# Patient Record
Sex: FEMALE | Race: BLACK OR AFRICAN AMERICAN | NOT HISPANIC OR LATINO | URBAN - METROPOLITAN AREA
[De-identification: names, ages, dates, MRNs, and addresses within clinical notes are randomized per-mention and may not be internally consistent; named-entity substitution may affect disease eponyms.]

---

## 2017-03-27 ENCOUNTER — EMERGENCY (EMERGENCY)
Facility: HOSPITAL | Age: 27
LOS: 1 days | Discharge: HOME | End: 2017-03-27
Attending: EMERGENCY MEDICINE | Admitting: EMERGENCY MEDICINE
Payer: MEDICAID

## 2017-03-27 VITALS
HEART RATE: 58 BPM | DIASTOLIC BLOOD PRESSURE: 64 MMHG | SYSTOLIC BLOOD PRESSURE: 101 MMHG | TEMPERATURE: 98 F | OXYGEN SATURATION: 100 % | RESPIRATION RATE: 15 BRPM

## 2017-03-27 VITALS
OXYGEN SATURATION: 100 % | WEIGHT: 130.07 LBS | TEMPERATURE: 98 F | SYSTOLIC BLOOD PRESSURE: 109 MMHG | RESPIRATION RATE: 16 BRPM | DIASTOLIC BLOOD PRESSURE: 79 MMHG | HEART RATE: 76 BPM

## 2017-03-27 DIAGNOSIS — R10.30 LOWER ABDOMINAL PAIN, UNSPECIFIED: ICD-10-CM

## 2017-03-27 LAB
ALBUMIN SERPL ELPH-MCNC: 3.6 G/DL — SIGNIFICANT CHANGE UP (ref 3.3–5)
ALP SERPL-CCNC: 62 U/L — SIGNIFICANT CHANGE UP (ref 40–120)
ALT FLD-CCNC: 25 U/L — SIGNIFICANT CHANGE UP (ref 12–78)
AMYLASE P1 CFR SERPL: 121 U/L — HIGH (ref 25–115)
ANION GAP SERPL CALC-SCNC: 8 MMOL/L — SIGNIFICANT CHANGE UP (ref 5–17)
APPEARANCE UR: CLEAR — SIGNIFICANT CHANGE UP
AST SERPL-CCNC: 21 U/L — SIGNIFICANT CHANGE UP (ref 15–37)
BACTERIA # UR AUTO: ABNORMAL
BASOPHILS # BLD AUTO: 0.1 K/UL — SIGNIFICANT CHANGE UP (ref 0–0.2)
BASOPHILS NFR BLD AUTO: 1.9 % — SIGNIFICANT CHANGE UP (ref 0–2)
BILIRUB SERPL-MCNC: 0.2 MG/DL — SIGNIFICANT CHANGE UP (ref 0.2–1.2)
BILIRUB UR-MCNC: NEGATIVE — SIGNIFICANT CHANGE UP
BUN SERPL-MCNC: 7 MG/DL — SIGNIFICANT CHANGE UP (ref 7–23)
CALCIUM SERPL-MCNC: 8.6 MG/DL — SIGNIFICANT CHANGE UP (ref 8.5–10.1)
CHLORIDE SERPL-SCNC: 109 MMOL/L — HIGH (ref 96–108)
CO2 SERPL-SCNC: 26 MMOL/L — SIGNIFICANT CHANGE UP (ref 22–31)
COD CRY URNS QL: ABNORMAL
COLOR SPEC: YELLOW — SIGNIFICANT CHANGE UP
COMMENT - URINE: SIGNIFICANT CHANGE UP
CREAT SERPL-MCNC: 0.77 MG/DL — SIGNIFICANT CHANGE UP (ref 0.5–1.3)
DIFF PNL FLD: ABNORMAL
EOSINOPHIL # BLD AUTO: 0.2 K/UL — SIGNIFICANT CHANGE UP (ref 0–0.5)
EOSINOPHIL NFR BLD AUTO: 5.1 % — SIGNIFICANT CHANGE UP (ref 0–6)
EPI CELLS # UR: SIGNIFICANT CHANGE UP
GLUCOSE SERPL-MCNC: 96 MG/DL — SIGNIFICANT CHANGE UP (ref 70–99)
GLUCOSE UR QL: NEGATIVE — SIGNIFICANT CHANGE UP
HCG SERPL-ACNC: <1 MIU/ML — SIGNIFICANT CHANGE UP
HCT VFR BLD CALC: 36.7 % — SIGNIFICANT CHANGE UP (ref 34.5–45)
HGB BLD-MCNC: 12.3 G/DL — SIGNIFICANT CHANGE UP (ref 11.5–15.5)
KETONES UR-MCNC: NEGATIVE — SIGNIFICANT CHANGE UP
LEUKOCYTE ESTERASE UR-ACNC: NEGATIVE — SIGNIFICANT CHANGE UP
LIDOCAIN IGE QN: 200 U/L — SIGNIFICANT CHANGE UP (ref 73–393)
LYMPHOCYTES # BLD AUTO: 1.8 K/UL — SIGNIFICANT CHANGE UP (ref 1–3.3)
LYMPHOCYTES # BLD AUTO: 37.5 % — SIGNIFICANT CHANGE UP (ref 13–44)
MCHC RBC-ENTMCNC: 31.2 PG — SIGNIFICANT CHANGE UP (ref 27–34)
MCHC RBC-ENTMCNC: 33.6 GM/DL — SIGNIFICANT CHANGE UP (ref 32–36)
MCV RBC AUTO: 92.7 FL — SIGNIFICANT CHANGE UP (ref 80–100)
MONOCYTES # BLD AUTO: 0.4 K/UL — SIGNIFICANT CHANGE UP (ref 0–0.9)
MONOCYTES NFR BLD AUTO: 9.4 % — HIGH (ref 1–9)
NEUTROPHILS # BLD AUTO: 2.2 K/UL — SIGNIFICANT CHANGE UP (ref 1.8–7.4)
NEUTROPHILS NFR BLD AUTO: 46 % — SIGNIFICANT CHANGE UP (ref 43–77)
NITRITE UR-MCNC: NEGATIVE — SIGNIFICANT CHANGE UP
PH UR: 7 — SIGNIFICANT CHANGE UP (ref 4.8–8)
PLATELET # BLD AUTO: 187 K/UL — SIGNIFICANT CHANGE UP (ref 150–400)
POTASSIUM SERPL-MCNC: 3.7 MMOL/L — SIGNIFICANT CHANGE UP (ref 3.5–5.3)
POTASSIUM SERPL-SCNC: 3.7 MMOL/L — SIGNIFICANT CHANGE UP (ref 3.5–5.3)
PROT SERPL-MCNC: 7 G/DL — SIGNIFICANT CHANGE UP (ref 6–8.3)
PROT UR-MCNC: NEGATIVE — SIGNIFICANT CHANGE UP
RBC # BLD: 3.96 M/UL — SIGNIFICANT CHANGE UP (ref 3.8–5.2)
RBC # FLD: 11.7 % — SIGNIFICANT CHANGE UP (ref 10.3–14.5)
RBC CASTS # UR COMP ASSIST: SIGNIFICANT CHANGE UP /HPF (ref 0–4)
SODIUM SERPL-SCNC: 143 MMOL/L — SIGNIFICANT CHANGE UP (ref 135–145)
SP GR SPEC: 1.01 — SIGNIFICANT CHANGE UP (ref 1.01–1.02)
UROBILINOGEN FLD QL: NEGATIVE — SIGNIFICANT CHANGE UP
WBC # BLD: 4.7 K/UL — SIGNIFICANT CHANGE UP (ref 3.8–10.5)
WBC # FLD AUTO: 4.7 K/UL — SIGNIFICANT CHANGE UP (ref 3.8–10.5)
WBC UR QL: SIGNIFICANT CHANGE UP

## 2017-03-27 PROCEDURE — 76830 TRANSVAGINAL US NON-OB: CPT | Mod: 26

## 2017-03-27 PROCEDURE — 99284 EMERGENCY DEPT VISIT MOD MDM: CPT

## 2017-03-27 RX ORDER — SODIUM CHLORIDE 9 MG/ML
3 INJECTION INTRAMUSCULAR; INTRAVENOUS; SUBCUTANEOUS ONCE
Qty: 0 | Refills: 0 | Status: COMPLETED | OUTPATIENT
Start: 2017-03-27 | End: 2017-03-27

## 2017-03-27 RX ORDER — SODIUM CHLORIDE 9 MG/ML
1000 INJECTION INTRAMUSCULAR; INTRAVENOUS; SUBCUTANEOUS ONCE
Qty: 0 | Refills: 0 | Status: COMPLETED | OUTPATIENT
Start: 2017-03-27 | End: 2017-03-27

## 2017-03-27 RX ORDER — ONDANSETRON 8 MG/1
4 TABLET, FILM COATED ORAL ONCE
Qty: 0 | Refills: 0 | Status: COMPLETED | OUTPATIENT
Start: 2017-03-27 | End: 2017-03-27

## 2017-03-27 RX ORDER — IOHEXOL 300 MG/ML
30 INJECTION, SOLUTION INTRAVENOUS ONCE
Qty: 0 | Refills: 0 | Status: COMPLETED | OUTPATIENT
Start: 2017-03-27 | End: 2017-03-27

## 2017-03-27 RX ADMIN — SODIUM CHLORIDE 1000 MILLILITER(S): 9 INJECTION INTRAMUSCULAR; INTRAVENOUS; SUBCUTANEOUS at 21:50

## 2017-03-27 RX ADMIN — IOHEXOL 30 MILLILITER(S): 300 INJECTION, SOLUTION INTRAVENOUS at 21:51

## 2017-03-27 RX ADMIN — SODIUM CHLORIDE 3 MILLILITER(S): 9 INJECTION INTRAMUSCULAR; INTRAVENOUS; SUBCUTANEOUS at 21:22

## 2017-03-27 RX ADMIN — ONDANSETRON 4 MILLIGRAM(S): 8 TABLET, FILM COATED ORAL at 21:50

## 2017-03-27 NOTE — ED PROVIDER NOTE - OBJECTIVE STATEMENT
pt c/o lower abd pain x 1 yr. no fevers, chills, ha, cp, sob, diarrhea, constipation, dysuria, hematuria, freq, vag bleed or d/c.  pmd - [facility] pt c/o lower abd pain x 1 yr. no fevers, chills, ha, cp, sob, diarrhea, constipation, dysuria, hematuria, freq, vag bleed or d/c. pt seen by gi for same, had neg colonoscopy.  pmd - [facility]  gi - [facility]

## 2017-03-27 NOTE — ED PROVIDER NOTE - CHPI ED SYMPTOMS NEG
no abdominal distension/no dysuria/no vomiting/no fever/no chills/no nausea/no hematuria/no diarrhea

## 2017-03-27 NOTE — ED PROVIDER NOTE - PROGRESS NOTE DETAILS
Reevaluated patient at bedside.  Patient feeling well.  Discussed the results of all diagnostic testing in ED and copies of all reports given.   An opportunity to ask questions was given.  Discussed the importance of prompt, close medical follow-up.  Patient will return with any changes, concerns or persistent / worsening symptoms.  Understanding of all instructions verbalized.

## 2017-03-28 LAB
CULTURE RESULTS: SIGNIFICANT CHANGE UP
SPECIMEN SOURCE: SIGNIFICANT CHANGE UP

## 2017-03-28 PROCEDURE — 99284 EMERGENCY DEPT VISIT MOD MDM: CPT | Mod: 25

## 2017-03-28 PROCEDURE — 74177 CT ABD & PELVIS W/CONTRAST: CPT | Mod: 26

## 2017-03-28 PROCEDURE — 83690 ASSAY OF LIPASE: CPT

## 2017-03-28 PROCEDURE — 76830 TRANSVAGINAL US NON-OB: CPT

## 2017-03-28 PROCEDURE — 85027 COMPLETE CBC AUTOMATED: CPT

## 2017-03-28 PROCEDURE — 80053 COMPREHEN METABOLIC PANEL: CPT

## 2017-03-28 PROCEDURE — 81001 URINALYSIS AUTO W/SCOPE: CPT

## 2017-03-28 PROCEDURE — 87086 URINE CULTURE/COLONY COUNT: CPT

## 2017-03-28 PROCEDURE — 74177 CT ABD & PELVIS W/CONTRAST: CPT

## 2017-03-28 PROCEDURE — 96374 THER/PROPH/DIAG INJ IV PUSH: CPT | Mod: 59

## 2017-03-28 PROCEDURE — 84702 CHORIONIC GONADOTROPIN TEST: CPT

## 2017-03-28 PROCEDURE — 82150 ASSAY OF AMYLASE: CPT

## 2017-04-22 NOTE — ED ADULT NURSE NOTE - OBJECTIVE STATEMENT
<<----- Click to add NO significant Past Surgical History
26 year old female presents to the ED with c/o abdominal pain. Pt states she has had chronic digestive issues for about a year. Today, the pain was so bad, she had to leave work. PT A+O x 4. Pt [id number] pain in the mid lower abdomen, worse with deep palpation. Pt reports nausea but denies vomiting or diarrhea. Pt reports occasional bouts of "loose stool".  + BS in all quadrants. LMP 3/6/17. Pt denies urinary s/s.

## 2017-04-23 ENCOUNTER — EMERGENCY (EMERGENCY)
Facility: HOSPITAL | Age: 27
LOS: 1 days | Discharge: HOME | End: 2017-04-23
Attending: EMERGENCY MEDICINE | Admitting: EMERGENCY MEDICINE
Payer: MEDICAID

## 2017-04-23 VITALS
OXYGEN SATURATION: 99 % | SYSTOLIC BLOOD PRESSURE: 119 MMHG | HEART RATE: 65 BPM | DIASTOLIC BLOOD PRESSURE: 74 MMHG | TEMPERATURE: 99 F | RESPIRATION RATE: 16 BRPM

## 2017-04-23 VITALS
OXYGEN SATURATION: 98 % | WEIGHT: 134.92 LBS | SYSTOLIC BLOOD PRESSURE: 121 MMHG | RESPIRATION RATE: 16 BRPM | HEART RATE: 678 BPM | HEIGHT: 65 IN | DIASTOLIC BLOOD PRESSURE: 73 MMHG | TEMPERATURE: 98 F

## 2017-04-23 LAB
ALBUMIN SERPL ELPH-MCNC: 3.9 G/DL — SIGNIFICANT CHANGE UP (ref 3.3–5)
ALP SERPL-CCNC: 56 U/L — SIGNIFICANT CHANGE UP (ref 40–120)
ALT FLD-CCNC: 19 U/L — SIGNIFICANT CHANGE UP (ref 12–78)
AMYLASE P1 CFR SERPL: 67 U/L — SIGNIFICANT CHANGE UP (ref 25–115)
ANION GAP SERPL CALC-SCNC: 11 MMOL/L — SIGNIFICANT CHANGE UP (ref 5–17)
APPEARANCE UR: CLEAR — SIGNIFICANT CHANGE UP
AST SERPL-CCNC: 16 U/L — SIGNIFICANT CHANGE UP (ref 15–37)
BACTERIA # UR AUTO: ABNORMAL
BILIRUB DIRECT SERPL-MCNC: 0.2 MG/DL — SIGNIFICANT CHANGE UP (ref 0.05–0.2)
BILIRUB INDIRECT FLD-MCNC: 0.5 MG/DL — SIGNIFICANT CHANGE UP (ref 0.2–1)
BILIRUB SERPL-MCNC: 0.7 MG/DL — SIGNIFICANT CHANGE UP (ref 0.2–1.2)
BILIRUB UR-MCNC: ABNORMAL
BUN SERPL-MCNC: 10 MG/DL — SIGNIFICANT CHANGE UP (ref 7–23)
CALCIUM SERPL-MCNC: 8.5 MG/DL — SIGNIFICANT CHANGE UP (ref 8.5–10.1)
CHLORIDE SERPL-SCNC: 105 MMOL/L — SIGNIFICANT CHANGE UP (ref 96–108)
CO2 SERPL-SCNC: 23 MMOL/L — SIGNIFICANT CHANGE UP (ref 22–31)
COLOR SPEC: YELLOW — SIGNIFICANT CHANGE UP
COMMENT - URINE: SIGNIFICANT CHANGE UP
CREAT SERPL-MCNC: 0.78 MG/DL — SIGNIFICANT CHANGE UP (ref 0.5–1.3)
DIFF PNL FLD: ABNORMAL
EPI CELLS # UR: SIGNIFICANT CHANGE UP
GLUCOSE SERPL-MCNC: 70 MG/DL — SIGNIFICANT CHANGE UP (ref 70–99)
GLUCOSE UR QL: NEGATIVE — SIGNIFICANT CHANGE UP
HCG SERPL-ACNC: <1 MIU/ML — SIGNIFICANT CHANGE UP
HCT VFR BLD CALC: 42.3 % — SIGNIFICANT CHANGE UP (ref 34.5–45)
HGB BLD-MCNC: 13.8 G/DL — SIGNIFICANT CHANGE UP (ref 11.5–15.5)
KETONES UR-MCNC: ABNORMAL
LEUKOCYTE ESTERASE UR-ACNC: ABNORMAL
LIDOCAIN IGE QN: 90 U/L — SIGNIFICANT CHANGE UP (ref 73–393)
MCHC RBC-ENTMCNC: 31.1 PG — SIGNIFICANT CHANGE UP (ref 27–34)
MCHC RBC-ENTMCNC: 32.7 GM/DL — SIGNIFICANT CHANGE UP (ref 32–36)
MCV RBC AUTO: 95.1 FL — SIGNIFICANT CHANGE UP (ref 80–100)
NITRITE UR-MCNC: NEGATIVE — SIGNIFICANT CHANGE UP
PH UR: 5 — SIGNIFICANT CHANGE UP (ref 5–8)
PLATELET # BLD AUTO: 215 K/UL — SIGNIFICANT CHANGE UP (ref 150–400)
POTASSIUM SERPL-MCNC: 3.4 MMOL/L — LOW (ref 3.5–5.3)
POTASSIUM SERPL-SCNC: 3.4 MMOL/L — LOW (ref 3.5–5.3)
PROT SERPL-MCNC: 7.4 G/DL — SIGNIFICANT CHANGE UP (ref 6–8.3)
PROT UR-MCNC: 25 MG/DL
RBC # BLD: 4.45 M/UL — SIGNIFICANT CHANGE UP (ref 3.8–5.2)
RBC # FLD: 11.4 % — SIGNIFICANT CHANGE UP (ref 10.3–14.5)
RBC CASTS # UR COMP ASSIST: SIGNIFICANT CHANGE UP /HPF (ref 0–4)
SODIUM SERPL-SCNC: 139 MMOL/L — SIGNIFICANT CHANGE UP (ref 135–145)
SP GR SPEC: 1.02 — SIGNIFICANT CHANGE UP (ref 1.01–1.02)
UROBILINOGEN FLD QL: 4
WBC # BLD: 3 K/UL — LOW (ref 3.8–10.5)
WBC # FLD AUTO: 3 K/UL — LOW (ref 3.8–10.5)
WBC UR QL: SIGNIFICANT CHANGE UP

## 2017-04-23 PROCEDURE — 83690 ASSAY OF LIPASE: CPT

## 2017-04-23 PROCEDURE — 93005 ELECTROCARDIOGRAM TRACING: CPT

## 2017-04-23 PROCEDURE — 81001 URINALYSIS AUTO W/SCOPE: CPT

## 2017-04-23 PROCEDURE — 99284 EMERGENCY DEPT VISIT MOD MDM: CPT

## 2017-04-23 PROCEDURE — 80076 HEPATIC FUNCTION PANEL: CPT

## 2017-04-23 PROCEDURE — 82150 ASSAY OF AMYLASE: CPT

## 2017-04-23 PROCEDURE — 84702 CHORIONIC GONADOTROPIN TEST: CPT

## 2017-04-23 PROCEDURE — 99284 EMERGENCY DEPT VISIT MOD MDM: CPT | Mod: 25

## 2017-04-23 PROCEDURE — 80048 BASIC METABOLIC PNL TOTAL CA: CPT

## 2017-04-23 PROCEDURE — 96374 THER/PROPH/DIAG INJ IV PUSH: CPT

## 2017-04-23 PROCEDURE — 85027 COMPLETE CBC AUTOMATED: CPT

## 2017-04-23 PROCEDURE — 93010 ELECTROCARDIOGRAM REPORT: CPT

## 2017-04-23 RX ORDER — SODIUM CHLORIDE 9 MG/ML
1000 INJECTION INTRAMUSCULAR; INTRAVENOUS; SUBCUTANEOUS ONCE
Qty: 0 | Refills: 0 | Status: COMPLETED | OUTPATIENT
Start: 2017-04-23 | End: 2017-04-23

## 2017-04-23 RX ORDER — ONDANSETRON 8 MG/1
4 TABLET, FILM COATED ORAL ONCE
Qty: 0 | Refills: 0 | Status: COMPLETED | OUTPATIENT
Start: 2017-04-23 | End: 2017-04-23

## 2017-04-23 RX ADMIN — SODIUM CHLORIDE 1000 MILLILITER(S): 9 INJECTION INTRAMUSCULAR; INTRAVENOUS; SUBCUTANEOUS at 10:15

## 2017-04-23 RX ADMIN — ONDANSETRON 4 MILLIGRAM(S): 8 TABLET, FILM COATED ORAL at 10:31

## 2017-04-23 NOTE — ED ADULT NURSE NOTE - OBJECTIVE STATEMENT
Pt reports feeling faint, weak & nauseous since yesterday. Pt reports she is under a lot of stress & is losing her hair. Pt reports googling her symptoms & becoming concerned that she might be very ill. Pt states "people are making fun of me about it." Pt reports Hx pancreatitis, states she is F/U with GI early May, reports abd. pain "once in a blue." Denies pain at this time. Pt reports muscle aches at times, states she has been dehydrated in the past. Pt reports she is currently being treated for thrush, dry tongue noted but no white patches present. Pt reports intermittent loose stool, states "it depends on what I eat."

## 2017-04-23 NOTE — ED PROVIDER NOTE - OBJECTIVE STATEMENT
27 y/o F c/o occasionally feeling lightheaded and nauseas for the past 2 days.  Denies fever, vomiting, pain, urinary symptoms, or any other complaints.

## 2017-04-23 NOTE — ED PROVIDER NOTE - PROGRESS NOTE DETAILS
Pt mentioned in triage that she wanted to speak with a .  However now denies to myself and RN that she wishes to speak with a .  Had long conversation with pt - regarding safety at home and any other concerns she may have.  Pt denies any issues.

## 2017-04-26 DIAGNOSIS — F17.210 NICOTINE DEPENDENCE, CIGARETTES, UNCOMPLICATED: ICD-10-CM

## 2017-04-26 DIAGNOSIS — R42 DIZZINESS AND GIDDINESS: ICD-10-CM

## 2017-04-26 DIAGNOSIS — R11.0 NAUSEA: ICD-10-CM

## 2019-08-21 NOTE — ED ADULT NURSE NOTE - URINE COLOR
NUBIA NARVAEZ    Patient Age: 36 year old   Refill request by: Phone.  Caller informed to check with the pharmacy later for their refill.  If problems arise, we will contact patient.  Refill to be: ePrescribed to [facility] #[id number] [address] AT [facility] OF [address]  pharmacy    Medication requested to be refilled:   levothyroxine (SYNTHROID, LEVOTHROID) 175 MCG tablet    Patient states that she received lab results via [employer]. States that pharmacy has not received script. States that she is out of medication. Message confirmed with caller        WEIGHT AND HEIGHT:   Wt Readings from Last 1 Encounters:   08/06/19 88.5 kg (195 lb)     Ht Readings from Last 1 Encounters:   07/11/19 5' 1\" (1.549 m)     BMI Readings from Last 1 Encounters:   08/06/19 36.84 kg/m²       ALLERGIES: no known allergies.  Current Outpatient Medications   Medication   • levothyroxine (SYNTHROID, LEVOTHROID) 175 MCG tablet   • Prenatal Vit-Fe Fum-FA-Omega (PNV PRENATAL PLUS MULTIVIT+DHA) 27-1 & 312 MG Misc     No current facility-administered medications for this visit.      PHARMACY to use: [facility] #[id number] [address] AT [facility] [address]               Pharmacy preference(s) on file:   [facility] #[id number] [address] AT [facility] [address]  [address]  [facility] [address]  Phone: [phone] Fax: [phone]    [id number] [place] [address]  [address]  [facility] [address]  Phone: [phone] Fax: [phone]      CALL BACK INFO: DO NOT LEAVE A MESSAGE - contact patient directly  ROUTING: ROUTE TO COVERING PHYSICIAN for response.        PCP: Maulik Fernandez MD         INS: Payor: [employer] / Plan: [employer] / Product Type: [place] Risk   PATIENT ADDRESS:  [address] [place] [id number]     pavel

## 2019-12-22 NOTE — ED PROVIDER NOTE - CPE EDP GU NORM
Contacted patient regarding clinic visit and condition. Patient feeling somewhat better, able to eat cereal this morning. Advised patient that if condition worsens again, she should be seen in ED for CT to rule out Diverticulitis. Patient amenable to this plan of action.
normal...

## 2022-08-08 NOTE — ED ADULT NURSE NOTE - DISCHARGE DATE/TIME
Results reviewed, please let the patient know that overall findings are reassuring, normal cardiac structure and function. Follow up as previously planned, thanks!     28-Mar-2017 00:20

## 2023-03-26 PROBLEM — K85.80 OTHER ACUTE PANCREATITIS WITHOUT NECROSIS OR INFECTION: Chronic | Status: ACTIVE | Noted: 2017-03-27

## 2023-03-29 ENCOUNTER — APPOINTMENT (OUTPATIENT)
Dept: OBGYN | Facility: CLINIC | Age: 33
End: 2023-03-29

## 2023-08-01 NOTE — ED ADULT TRIAGE NOTE - TEMPERATURE IN FAHRENHEIT (DEGREES F)
After review of her left hip xray, can get patient scheduled for left hip injection in xray, if patient would like this done, message left to return our call.
98

## 2024-05-15 NOTE — ED ADULT NURSE NOTE - NS ED NURSE LEVEL OF CONSCIOUSNESS MENTAL STATUS
Alert/Awake/Cooperative Pt is awake, alert and ambulatory complaining of lower abdominal pain and fever. Pt got known hx of DM on metformin.

## 2025-01-16 DIAGNOSIS — O09.30 SUPERVISION OF PREGNANCY WITH INSUFFICIENT ANTENATAL CARE, UNSPECIFIED TRIMESTER: ICD-10-CM

## 2025-01-16 DIAGNOSIS — F17.200 NICOTINE DEPENDENCE, UNSPECIFIED, UNCOMPLICATED: ICD-10-CM

## 2025-01-20 ENCOUNTER — NON-APPOINTMENT (OUTPATIENT)
Age: 35
End: 2025-01-20

## 2025-01-20 ENCOUNTER — ASOB RESULT (OUTPATIENT)
Age: 35
End: 2025-01-20

## 2025-01-20 ENCOUNTER — APPOINTMENT (OUTPATIENT)
Dept: ANTEPARTUM | Facility: CLINIC | Age: 35
End: 2025-01-20
Payer: MEDICARE

## 2025-01-20 PROCEDURE — 76811 OB US DETAILED SNGL FETUS: CPT

## 2025-01-23 ENCOUNTER — APPOINTMENT (OUTPATIENT)
Dept: ANTEPARTUM | Facility: CLINIC | Age: 35
End: 2025-01-23
Payer: MEDICARE

## 2025-01-23 ENCOUNTER — APPOINTMENT (OUTPATIENT)
Dept: MATERNAL FETAL MEDICINE | Facility: CLINIC | Age: 35
End: 2025-01-23
Payer: MEDICARE

## 2025-01-23 VITALS
HEIGHT: 65 IN | HEART RATE: 78 BPM | DIASTOLIC BLOOD PRESSURE: 68 MMHG | OXYGEN SATURATION: 95 % | SYSTOLIC BLOOD PRESSURE: 100 MMHG | BODY MASS INDEX: 22.82 KG/M2 | WEIGHT: 137 LBS

## 2025-01-23 DIAGNOSIS — F17.200 NICOTINE DEPENDENCE, UNSPECIFIED, UNCOMPLICATED: ICD-10-CM

## 2025-01-23 DIAGNOSIS — F20.9 SCHIZOPHRENIA, UNSPECIFIED: ICD-10-CM

## 2025-01-23 DIAGNOSIS — O09.529 SUPERVISION OF ELDERLY MULTIGRAVIDA, UNSPECIFIED TRIMESTER: ICD-10-CM

## 2025-01-23 DIAGNOSIS — Z78.9 OTHER SPECIFIED HEALTH STATUS: ICD-10-CM

## 2025-01-23 DIAGNOSIS — O09.891 SUPERVISION OF OTHER HIGH RISK PREGNANCIES, FIRST TRIMESTER: ICD-10-CM

## 2025-01-23 DIAGNOSIS — Z3A.22 22 WEEKS GESTATION OF PREGNANCY: ICD-10-CM

## 2025-01-23 DIAGNOSIS — F31.9 OTHER MENTAL DISORDERS COMPLICATING PREGNANCY, UNSPECIFIED TRIMESTER: ICD-10-CM

## 2025-01-23 DIAGNOSIS — O99.340 OTHER MENTAL DISORDERS COMPLICATING PREGNANCY, UNSPECIFIED TRIMESTER: ICD-10-CM

## 2025-01-23 DIAGNOSIS — Z84.89 FAMILY HISTORY OF OTHER SPECIFIED CONDITIONS: ICD-10-CM

## 2025-01-23 PROCEDURE — ZZZZZ: CPT

## 2025-01-23 PROCEDURE — 99204 OFFICE O/P NEW MOD 45 MIN: CPT

## 2025-01-23 RX ORDER — GABAPENTIN 300 MG/1
300 CAPSULE ORAL
Refills: 0 | Status: ACTIVE | COMMUNITY
Start: 2025-01-23

## 2025-01-23 RX ORDER — CHOLECALCIFEROL (VITAMIN D3) 25 MCG
27-0.8-228 TABLET,CHEWABLE ORAL
Refills: 0 | Status: ACTIVE | COMMUNITY
Start: 2025-01-23

## 2025-01-23 RX ORDER — MIRTAZAPINE 15 MG/1
15 TABLET, FILM COATED ORAL
Refills: 0 | Status: ACTIVE | COMMUNITY
Start: 2025-01-23

## 2025-01-23 RX ORDER — LAMOTRIGINE 150 MG/1
150 TABLET ORAL DAILY
Refills: 0 | Status: ACTIVE | COMMUNITY
Start: 2025-01-23

## 2025-03-03 ENCOUNTER — APPOINTMENT (OUTPATIENT)
Dept: ANTEPARTUM | Facility: CLINIC | Age: 35
End: 2025-03-03

## 2025-03-10 ENCOUNTER — APPOINTMENT (OUTPATIENT)
Dept: ANTEPARTUM | Facility: CLINIC | Age: 35
End: 2025-03-10

## 2025-03-24 ENCOUNTER — INPATIENT (INPATIENT)
Facility: HOSPITAL | Age: 35
LOS: 3 days | Discharge: HOME | End: 2025-03-28
Attending: OBSTETRICS & GYNECOLOGY | Admitting: OBSTETRICS & GYNECOLOGY
Payer: MEDICARE

## 2025-03-24 ENCOUNTER — TRANSCRIPTION ENCOUNTER (OUTPATIENT)
Age: 35
End: 2025-03-24

## 2025-03-24 ENCOUNTER — RESULT REVIEW (OUTPATIENT)
Age: 35
End: 2025-03-24

## 2025-03-24 VITALS — TEMPERATURE: 98 F | HEART RATE: 84 BPM | SYSTOLIC BLOOD PRESSURE: 113 MMHG | DIASTOLIC BLOOD PRESSURE: 63 MMHG

## 2025-03-24 DIAGNOSIS — O26.899 OTHER SPECIFIED PREGNANCY RELATED CONDITIONS, UNSPECIFIED TRIMESTER: ICD-10-CM

## 2025-03-24 DIAGNOSIS — O26.893 OTHER SPECIFIED PREGNANCY RELATED CONDITIONS, THIRD TRIMESTER: ICD-10-CM

## 2025-03-24 LAB
ABO RH CONFIRMATION: SIGNIFICANT CHANGE UP
BASOPHILS # BLD AUTO: 0.05 K/UL — SIGNIFICANT CHANGE UP (ref 0–0.2)
BASOPHILS NFR BLD AUTO: 0.4 % — SIGNIFICANT CHANGE UP (ref 0–2)
BLD GP AB SCN SERPL QL: SIGNIFICANT CHANGE UP
EOSINOPHIL # BLD AUTO: 0.22 K/UL — SIGNIFICANT CHANGE UP (ref 0–0.5)
EOSINOPHIL NFR BLD AUTO: 1.7 % — SIGNIFICANT CHANGE UP (ref 0–6)
HBV SURFACE AG SERPL QL IA: SIGNIFICANT CHANGE UP
HCT VFR BLD CALC: 31.9 % — LOW (ref 34.5–45)
HCT VFR BLD CALC: 32.3 % — LOW (ref 34.5–45)
HCV AB S/CO SERPL IA: 0.07 S/CO — SIGNIFICANT CHANGE UP (ref 0–0.79)
HCV AB SERPL-IMP: SIGNIFICANT CHANGE UP
HGB BLD-MCNC: 11.2 G/DL — LOW (ref 11.5–15.5)
HGB BLD-MCNC: 11.4 G/DL — LOW (ref 11.5–15.5)
HIV 1 & 2 AB SERPL IA.RAPID: SIGNIFICANT CHANGE UP
HIV 1+2 AB+HIV1 P24 AG SERPL QL IA: SIGNIFICANT CHANGE UP
IMM GRANULOCYTES # BLD AUTO: 0.2 K/UL — HIGH (ref 0–0.07)
IMM GRANULOCYTES NFR BLD AUTO: 1.6 % — HIGH (ref 0–0.9)
LYMPHOCYTES # BLD AUTO: 1.26 K/UL — SIGNIFICANT CHANGE UP (ref 1–3.3)
LYMPHOCYTES NFR BLD AUTO: 9.9 % — LOW (ref 13–44)
MCHC RBC-ENTMCNC: 31.1 PG — SIGNIFICANT CHANGE UP (ref 27–34)
MCHC RBC-ENTMCNC: 35.3 G/DL — SIGNIFICANT CHANGE UP (ref 32–36)
MCV RBC AUTO: 88.3 FL — SIGNIFICANT CHANGE UP (ref 80–100)
MEV IGG SER-ACNC: >300 AU/ML — SIGNIFICANT CHANGE UP
MEV IGG+IGM SER-IMP: POSITIVE — SIGNIFICANT CHANGE UP
MONOCYTES # BLD AUTO: 0.74 K/UL — SIGNIFICANT CHANGE UP (ref 0–0.9)
MONOCYTES NFR BLD AUTO: 5.8 % — SIGNIFICANT CHANGE UP (ref 2–14)
NEUTROPHILS # BLD AUTO: 10.3 K/UL — HIGH (ref 1.8–7.4)
NEUTROPHILS NFR BLD AUTO: 80.6 % — HIGH (ref 43–77)
NRBC # BLD AUTO: 0 K/UL — SIGNIFICANT CHANGE UP (ref 0–0)
NRBC # FLD: 0 K/UL — SIGNIFICANT CHANGE UP (ref 0–0)
NRBC BLD AUTO-RTO: 0 /100 WBCS — SIGNIFICANT CHANGE UP (ref 0–0)
PLATELET # BLD AUTO: 203 K/UL — SIGNIFICANT CHANGE UP (ref 150–400)
PMV BLD: 11.2 FL — SIGNIFICANT CHANGE UP (ref 7–13)
RBC # BLD: 3.66 M/UL — LOW (ref 3.8–5.2)
RBC # FLD: 14.1 % — SIGNIFICANT CHANGE UP (ref 10.3–14.5)
RUBV IGG SER-ACNC: 4.02 INDEX — SIGNIFICANT CHANGE UP
RUBV IGG SER-IMP: POSITIVE — SIGNIFICANT CHANGE UP
T PALLIDUM AB TITR SER: NEGATIVE — SIGNIFICANT CHANGE UP
WBC # BLD: 12.77 K/UL — HIGH (ref 3.8–10.5)
WBC # FLD AUTO: 12.77 K/UL — HIGH (ref 3.8–10.5)

## 2025-03-24 PROCEDURE — 88307 TISSUE EXAM BY PATHOLOGIST: CPT | Mod: 26

## 2025-03-24 PROCEDURE — 99223 1ST HOSP IP/OBS HIGH 75: CPT

## 2025-03-24 RX ORDER — DIBUCAINE 10 MG/G
1 OINTMENT TOPICAL EVERY 6 HOURS
Refills: 0 | Status: DISCONTINUED | OUTPATIENT
Start: 2025-03-24 | End: 2025-03-28

## 2025-03-24 RX ORDER — OXYCODONE HYDROCHLORIDE 30 MG/1
5 TABLET ORAL ONCE
Refills: 0 | Status: DISCONTINUED | OUTPATIENT
Start: 2025-03-24 | End: 2025-03-28

## 2025-03-24 RX ORDER — OXYTOCIN-SODIUM CHLORIDE 0.9% IV SOLN 30 UNIT/500ML 30-0.9/5 UT/ML-%
10 SOLUTION INTRAVENOUS ONCE
Refills: 0 | Status: COMPLETED | OUTPATIENT
Start: 2025-03-24 | End: 2025-03-24

## 2025-03-24 RX ORDER — CITRIC ACID/SODIUM CITRATE 300-500 MG
30 SOLUTION, ORAL ORAL ONCE
Refills: 0 | Status: DISCONTINUED | OUTPATIENT
Start: 2025-03-24 | End: 2025-03-24

## 2025-03-24 RX ORDER — SIMETHICONE 80 MG
80 TABLET,CHEWABLE ORAL EVERY 4 HOURS
Refills: 0 | Status: DISCONTINUED | OUTPATIENT
Start: 2025-03-24 | End: 2025-03-28

## 2025-03-24 RX ORDER — HYDROCORTISONE 10 MG/G
1 CREAM TOPICAL EVERY 6 HOURS
Refills: 0 | Status: DISCONTINUED | OUTPATIENT
Start: 2025-03-24 | End: 2025-03-28

## 2025-03-24 RX ORDER — IBUPROFEN 200 MG
600 TABLET ORAL EVERY 6 HOURS
Refills: 0 | Status: COMPLETED | OUTPATIENT
Start: 2025-03-24 | End: 2026-02-20

## 2025-03-24 RX ORDER — DIPHENHYDRAMINE HCL 12.5MG/5ML
25 ELIXIR ORAL EVERY 6 HOURS
Refills: 0 | Status: DISCONTINUED | OUTPATIENT
Start: 2025-03-24 | End: 2025-03-28

## 2025-03-24 RX ORDER — SODIUM CHLORIDE 9 G/1000ML
1000 INJECTION, SOLUTION INTRAVENOUS
Refills: 0 | Status: DISCONTINUED | OUTPATIENT
Start: 2025-03-24 | End: 2025-03-24

## 2025-03-24 RX ORDER — WITCH HAZEL LEAF
1 FLUID EXTRACT MISCELLANEOUS EVERY 4 HOURS
Refills: 0 | Status: DISCONTINUED | OUTPATIENT
Start: 2025-03-24 | End: 2025-03-28

## 2025-03-24 RX ORDER — CLOSTRIDIUM TETANI TOXOID ANTIGEN (FORMALDEHYDE INACTIVATED), CORYNEBACTERIUM DIPHTHERIAE TOXOID ANTIGEN (FORMALDEHYDE INACTIVATED), BORDETELLA PERTUSSIS TOXOID ANTIGEN (GLUTARALDEHYDE INACTIVATED), BORDETELLA PERTUSSIS FILAMENTOUS HEMAGGLUTININ ANTIGEN (FORMALDEHYDE INACTIVATED), BORDETELLA PERTUSSIS PERTACTIN ANTIGEN, AND BORDETELLA PERTUSSIS FIMBRIAE 2/3 ANTIGEN 5; 2; 2.5; 5; 3; 5 [LF]/.5ML; [LF]/.5ML; UG/.5ML; UG/.5ML; UG/.5ML; UG/.5ML
0.5 INJECTION, SUSPENSION INTRAMUSCULAR ONCE
Refills: 0 | Status: DISCONTINUED | OUTPATIENT
Start: 2025-03-24 | End: 2025-03-28

## 2025-03-24 RX ORDER — ACETAMINOPHEN 500 MG/5ML
975 LIQUID (ML) ORAL
Refills: 0 | Status: DISCONTINUED | OUTPATIENT
Start: 2025-03-24 | End: 2025-03-28

## 2025-03-24 RX ORDER — OXYTOCIN-SODIUM CHLORIDE 0.9% IV SOLN 30 UNIT/500ML 30-0.9/5 UT/ML-%
167 SOLUTION INTRAVENOUS
Qty: 30 | Refills: 0 | Status: DISCONTINUED | OUTPATIENT
Start: 2025-03-24 | End: 2025-03-28

## 2025-03-24 RX ORDER — IBUPROFEN 200 MG
600 TABLET ORAL EVERY 6 HOURS
Refills: 0 | Status: DISCONTINUED | OUTPATIENT
Start: 2025-03-24 | End: 2025-03-28

## 2025-03-24 RX ORDER — MIRTAZAPINE 30 MG/1
15 TABLET, FILM COATED ORAL AT BEDTIME
Refills: 0 | Status: DISCONTINUED | OUTPATIENT
Start: 2025-03-24 | End: 2025-03-28

## 2025-03-24 RX ORDER — OXYCODONE HYDROCHLORIDE 30 MG/1
5 TABLET ORAL
Refills: 0 | Status: DISCONTINUED | OUTPATIENT
Start: 2025-03-24 | End: 2025-03-28

## 2025-03-24 RX ORDER — PRENATAL 136/IRON/FOLIC ACID 27 MG-1 MG
1 TABLET ORAL DAILY
Refills: 0 | Status: DISCONTINUED | OUTPATIENT
Start: 2025-03-24 | End: 2025-03-28

## 2025-03-24 RX ORDER — KETOROLAC TROMETHAMINE 30 MG/ML
30 INJECTION, SOLUTION INTRAMUSCULAR; INTRAVENOUS ONCE
Refills: 0 | Status: DISCONTINUED | OUTPATIENT
Start: 2025-03-24 | End: 2025-03-24

## 2025-03-24 RX ORDER — MODIFIED LANOLIN 100 %
1 CREAM (GRAM) TOPICAL EVERY 6 HOURS
Refills: 0 | Status: DISCONTINUED | OUTPATIENT
Start: 2025-03-24 | End: 2025-03-28

## 2025-03-24 RX ORDER — BENZOCAINE 220 MG/G
1 SPRAY, METERED PERIODONTAL EVERY 6 HOURS
Refills: 0 | Status: DISCONTINUED | OUTPATIENT
Start: 2025-03-24 | End: 2025-03-28

## 2025-03-24 RX ORDER — OXYTOCIN-SODIUM CHLORIDE 0.9% IV SOLN 30 UNIT/500ML 30-0.9/5 UT/ML-%
167 SOLUTION INTRAVENOUS
Qty: 30 | Refills: 0 | Status: DISCONTINUED | OUTPATIENT
Start: 2025-03-24 | End: 2025-03-24

## 2025-03-24 RX ORDER — LAMOTRIGINE 150 MG/1
25 TABLET ORAL DAILY
Refills: 0 | Status: DISCONTINUED | OUTPATIENT
Start: 2025-03-24 | End: 2025-03-28

## 2025-03-24 RX ORDER — PRAMOXINE HCL 1 %
1 GEL (GRAM) TOPICAL EVERY 4 HOURS
Refills: 0 | Status: DISCONTINUED | OUTPATIENT
Start: 2025-03-24 | End: 2025-03-28

## 2025-03-24 RX ORDER — MAGNESIUM HYDROXIDE 400 MG/5ML
30 SUSPENSION ORAL
Refills: 0 | Status: DISCONTINUED | OUTPATIENT
Start: 2025-03-24 | End: 2025-03-28

## 2025-03-24 RX ORDER — GABAPENTIN 400 MG/1
300 CAPSULE ORAL ONCE
Refills: 0 | Status: COMPLETED | OUTPATIENT
Start: 2025-03-24 | End: 2025-03-24

## 2025-03-24 RX ADMIN — OXYTOCIN-SODIUM CHLORIDE 0.9% IV SOLN 30 UNIT/500ML 167 MILLIUNIT(S)/MIN: 30-0.9/5 SOLUTION at 01:51

## 2025-03-24 RX ADMIN — Medication 975 MILLIGRAM(S): at 21:44

## 2025-03-24 RX ADMIN — MIRTAZAPINE 15 MILLIGRAM(S): 30 TABLET, FILM COATED ORAL at 21:44

## 2025-03-24 RX ADMIN — Medication 600 MILLIGRAM(S): at 17:16

## 2025-03-24 RX ADMIN — LAMOTRIGINE 25 MILLIGRAM(S): 150 TABLET ORAL at 14:04

## 2025-03-24 RX ADMIN — Medication 975 MILLIGRAM(S): at 04:01

## 2025-03-24 RX ADMIN — Medication 1 TABLET(S): at 11:22

## 2025-03-24 RX ADMIN — GABAPENTIN 300 MILLIGRAM(S): 400 CAPSULE ORAL at 14:04

## 2025-03-24 RX ADMIN — OXYTOCIN-SODIUM CHLORIDE 0.9% IV SOLN 30 UNIT/500ML 10 UNIT(S): 30-0.9/5 SOLUTION at 01:33

## 2025-03-24 RX ADMIN — KETOROLAC TROMETHAMINE 30 MILLIGRAM(S): 30 INJECTION, SOLUTION INTRAMUSCULAR; INTRAVENOUS at 01:55

## 2025-03-24 RX ADMIN — Medication 975 MILLIGRAM(S): at 14:03

## 2025-03-24 RX ADMIN — Medication 975 MILLIGRAM(S): at 09:09

## 2025-03-24 NOTE — DISCHARGE NOTE OB - MATERIALS PROVIDED
Vaccinations/  Immunization Record/Guide to Postpartum Care/[facility] Hearing Screen Program/Back To Sleep Handout/Shaken Baby Prevention Handout/Birth Certificate Instructions/Discharge Medication Information for Patients and Families Pocket Guide/MMR Vaccination (VIS Pub Date: 2012)/Tdap Vaccination (VIS Pub Date: 2012)

## 2025-03-24 NOTE — BH CONSULTATION LIAISON ASSESSMENT NOTE - NSBHCHARTREVIEWVS_PSY_A_CORE FT
Vital Signs Last 24 Hrs  T(C): 36.7 (24 Mar 2025 08:00), Max: 37.1 (24 Mar 2025 03:59)  T(F): 98 (24 Mar 2025 08:00), Max: 98.8 (24 Mar 2025 03:59)  HR: 65 (24 Mar 2025 08:00) (62 - 84)  BP: 119/73 (24 Mar 2025 08:00) (106/67 - 121/76)  BP(mean): --  RR: 18 (24 Mar 2025 08:00) (18 - 18)  SpO2: 98% (24 Mar 2025 08:00) (98% - 98%)    Parameters below as of 24 Mar 2025 08:00  Patient On (Oxygen Delivery Method): room air

## 2025-03-24 NOTE — BH CONSULTATION LIAISON ASSESSMENT NOTE - ADDITIONAL DETAILS ALL
reports history of wanting to jump off an overpass years ago but notes that she would never be able to do it because of her child at the time, her mom and because she is too scared to ever go through with it

## 2025-03-24 NOTE — BH CONSULTATION LIAISON ASSESSMENT NOTE - NSBHCONSULTFOLLOWAFTERCARE_PSY_A_CORE FT
patient has ACT Team who will follow up with the patient upon discharge. she requires housing which needs to be coordinated from the hospital according to her outpatient team as her current residence does not allow children there; patient is considering placement at [facility] which routinely drug tests patients and patient is amenable to pursuing sobriety at this time

## 2025-03-24 NOTE — OB PROVIDER H&P - NSHPPHYSICALEXAM_GEN_ALL_CORE
T(C): 36.8 (03-24-25 @ 01:40), Max: 36.8 (03-24-25 @ 01:40)  HR: 83 (03-24-25 @ 01:55) (83 - 84)  BP: 117/72 (03-24-25 @ 01:55) (113/63 - 117/72)  RR: --  SpO2: --    Gen: NAD, well-appearing, AAOx3   Abd: Soft, gravid, fundus measuring 10cm above umbilicus   Ext: non-tender, non-edematous  SSE: pooling visualized, no bleeding, os visually dilated, top of baby's head visualized through os   SVE: 8/90/-1  FHT: appears around 150, discontinuous   [place]: q2m

## 2025-03-24 NOTE — BH CONSULTATION LIAISON ASSESSMENT NOTE - CURRENT MEDICATION
MEDICATIONS  (STANDING):  acetaminophen     Tablet .. 975 milliGRAM(s) Oral <User Schedule>  diphtheria/tetanus/pertussis (acellular) Vaccine (Adacel) 0.5 milliLiter(s) IntraMuscular once  ibuprofen  Tablet. 600 milliGRAM(s) Oral every 6 hours  lamoTRIgine 25 milliGRAM(s) Oral daily  mirtazapine 15 milliGRAM(s) Oral at bedtime  oxytocin Infusion 167 milliUNIT(s)/Min (167 mL/Hr) IV Continuous <Continuous>  prenatal multivitamin 1 Tablet(s) Oral daily  sodium chloride 0.9% lock flush 3 milliLiter(s) IV Push every 8 hours    MEDICATIONS  (PRN):  benzocaine 20%/menthol 0.5% Spray 1 Spray(s) Topical every 6 hours PRN for Perineal discomfort  dibucaine 1% Ointment 1 Application(s) Topical every 6 hours PRN Perineal discomfort  diphenhydrAMINE 25 milliGRAM(s) Oral every 6 hours PRN Pruritus  hydrocortisone 1% Cream 1 Application(s) Topical every 6 hours PRN Moderate Pain (4-6)  lanolin Ointment 1 Application(s) Topical every 6 hours PRN nipple soreness  magnesium hydroxide Suspension 30 milliLiter(s) Oral two times a day PRN Constipation  oxyCODONE    IR 5 milliGRAM(s) Oral every 3 hours PRN Moderate to Severe Pain (4-10)  oxyCODONE    IR 5 milliGRAM(s) Oral once PRN Moderate to Severe Pain (4-10)  pramoxine 1%/zinc 5% Cream 1 Application(s) Topical every 4 hours PRN Moderate Pain (4-6)  simethicone 80 milliGRAM(s) Chew every 4 hours PRN Gas  witch hazel Pads 1 Application(s) Topical every 4 hours PRN Perineal discomfort

## 2025-03-24 NOTE — BH CONSULTATION LIAISON ASSESSMENT NOTE - HPI (INCLUDE ILLNESS QUALITY, SEVERITY, DURATION, TIMING, CONTEXT, MODIFYING FACTORS, ASSOCIATED SIGNS AND SYMPTOMS)
This is a 34 year old female with no significant PMHx, PPHx of bipolar disorder, cannabis abuse, at least 2 past IPU admissions (last in 2022 at [facility] for ~2 months due to mood symptoms with psychosis), no past SAs but hx of SI, followed by [place] [facility] Team since her discharged from [facility] in , currently unemployed but receives social security benefits, has one daughter (18) who lives with the patient's mom. She is currently admitted following giving birth to a premature baby (31 weeks) and Psychiatry was consulted for medication management.    I personally worked with this patient back in  when she was hospitalized inpatient at [facility] and recall that she was psychotic at the time with minimal insight into her illness, and also did not wish to take medications at the time. Fast forward to today, patient reports that "taking medication changed my life." She states that it was difficult for her to reconcile with her bipolar diagnosis at the time but after taking medication and seeing how it turned her life around, she was able to appreciate the importance of taking her mental health seriously. In , she was having significant interpersonal conflict with her mom (was living with her at the time) and notes "I still don't get along that well with my mom," noting that she has not lived with her since discharge from [facility] in , currently living at [facility] in [place] with a roommate and has not had any issues living there. She reports that she was training to work at [employer] previously (required to take a bus [place] each day in order to participate in training which reportedly is a 12 month training) but after finding out that she is pregnant, she had to unfortunately stop her training and has been jobless since. She states that she is concerned about her finances, especially in terms of figuring out how to raise her  son, but feels confident that she will figure it out - notes that she was supposed to have a job interview today but had to miss the interview due to the birth of her son. She notes that she doesn't expect much support from the baby's father as she considers him to be a "deadbeat" noting that she has had to lend him money from time to time from her social security check. That being said, she knows that she "doesn't need him anyway" and notes that she plans to start working right away and also hopes to take advantage of child welfare programs to give her son the best life possible. She denies SI/HI, AVH and other perceptual disturbances.    Collateral from patient's outpatient psychiatrist Dr. Dunphy of the ACT Team ([phone]) notes that the patient has maintained good compliance and response with treatment since her discharge from [facility] in . She notes that there have been a couple of instances over the last 3 years where the patient felt depressed but appropriately reached out for help. She notes that patient has been maintained on a regimen of Haldol dec 100 mg IM monthly, Lamictal 150 mg daily, Remeron 15 mg qhs and Gabapentin 600 mg qhs for the last several years. She notes that patient did not know that she was pregnant until she was 20 weeks pregnant when she went to an urgent care for treatment of bronchitis and found she was pregnant then. She notes that the patient did want to stop all of her medications around that time due to concern of the effects the medication could have on her baby but she was able to be convinced to at least continue her Haldol dec and her Remeron, notes that she was given Haldol dec 75 mg instead on 2025. Patient has continued to consume cannabis daily, even after discovering that she was pregnant.

## 2025-03-24 NOTE — OB PROVIDER DELIVERY SUMMARY - NSPROVIDERDELIVERYNOTE_OBGYN_ALL_OB_FT
Procedure: Normal Vaginal Delivery    Findings: Viable male infant delivered in cephalic presentation at 0128, placenta delivered at 0133  Apgar 8/9  Laceration: none   QBL: 100     Procedure: Delivered OP, no nuchal cord, clear fluid. Infant's head delivered with maternal expulsive efforts. Shoulders delivered without difficulty followed by the rest of the body. Cord was immediately clamped and cut. Baby was handed to neonatologist. Placenta delivered spontaneously, intact at 0133. Patient was without IV access and IM pitocin was administered. Excellent hemostasis achieved. Pt tolerated procedure well, in stable condition, recovering in LDR. Infant in NICU. Instrument/sponge count correct x 2 and confirmed by nurse.    Complications: none

## 2025-03-24 NOTE — BH CONSULTATION LIAISON ASSESSMENT NOTE - MEDICAL RECORD REVIEWED
Patient Education     Ruptured Eardrum, Traumatic    The eardrum (also called the tympanic membrane) is a thin membrane about 1 inch from the opening of the ear canal. It's easily injured by foreign objects put into the ear canal. It may also be harmed by very loud noises close to the ear such as a gunshot, or a powerful slap to the ear. And it can be injured by trauma to the head or ear.   A burst (ruptured) eardrum can occur from pressure changes caused by a blast or explosion, flying, scuba diving, or driving in the mountains. Inserting cotton swabs or small objects can also rupture the eardrum. A ruptured eardrum will cause pain. There may be clear or bloody drainage. A buzzing sound may be heard in the ear. In severe cases, you may get dizzy or feel like you are spinning. Some hearing may be lost in the affected ear.   The goal is to keep the ear dry and clean until the eardrum heals. Antibiotics may be prescribed if infection is present. Follow-up with ear and hearing specialists is advised. In many cases, hearing returns to normal after the eardrum heals. But surgery is needed in severe cases.   Home care    Keep your ear dry. Ask your healthcare provider what to use to prevent your eardrum from becoming wet when taking a shower or bath.    Don't use eardrops unless prescribed by the healthcare provider.    Don't get water in your ear when showering or bathing. No swimming until approved by your provider.    No air travel or diving until approved by your provider.    Take any prescription or over-the-counter medicines as advised.    Follow-up care  Follow up with specialists for test or exams as directed. A hearing test should be done soon after the injury. Also follow up within 2 weeks, or as directed by your healthcare provider, to check healing of the eardrum.   When to get medical advice    Call the healthcare provider if any of these occur:    Fever in children (see Fever and children, below)    Fever  of 100.4 F (38 C) in adults    Fluid drainage from the ear for longer than 24 hours    More ear pain    Worsening headache or dizziness    Worsening hearing loss  Fever and children  Use a digital thermometer to check your child s temperature. Don t use a mercury thermometer. There are different kinds and uses of digital thermometers. They include:     Rectal. For children younger than 3 years, a rectal temperature is the most accurate.    Forehead (temporal). This works for children age 3 months and older. If a child under 3 months old has signs of illness, this can be used for a first pass. The provider may want to confirm with a rectal temperature.    Ear (tympanic). Ear temperatures are accurate after 6 months of age, but not before.    Armpit (axillary). This is the least reliable but may be used for a first pass to check a child of any age with signs of illness. The provider may want to confirm with a rectal temperature.    Mouth (oral). Don t use a thermometer in your child s mouth until he or she is at least 4 years old.  Use the rectal thermometer with care. Follow the product maker s directions for correct use. Insert it gently. Label it and make sure it s not used in the mouth. It may pass on germs from the stool. If you don t feel OK using a rectal thermometer, ask the healthcare provider what type to use instead. When you talk with any healthcare provider about your child s fever, tell him or her which type you used.   Below are guidelines to know if your young child has a fever. Your child s healthcare provider may give you different numbers for your child. Follow your provider s specific instructions.   Fever readings for a baby under 3 months old:     First, ask your child s healthcare provider how you should take the temperature.    Rectal or forehead: 100.4 F (38 C) or higher    Armpit: 99 F (37.2 C) or higher  Fever readings for a child age 3 months to 36 months (3 years):     Rectal, forehead, or  ear: 102 F (38.9 C) or higher    Armpit: 101 F (38.3 C) or higher  Call the healthcare provider in these cases:    Repeated temperature of 104 F (40 C) or higher in a child of any age    Fever of 100.4 F (38 C) or higher in baby younger than 3 months    Fever that lasts more than 24 hours in a child under age 2    Fever that lasts for 3 days in a child age 2 or older    [employer] last reviewed this educational content on 3/1/2020    3281-0089 [employer]. All rights reserved. This information is not intended as a substitute for professional medical care. Always follow your healthcare professional's instructions.            Yes

## 2025-03-24 NOTE — OB NEONATOLOGY/PEDIATRICIAN DELIVERY SUMMARY - NSPEDSNEONOTESA_OBGYN_ALL_OB_FT
Called by OB to attend the vaginal delivery of a 34 yrs old  mom at around 31 weeks of gestation (Lino score assessment is 31 weeks). minimal prenatal care, fetal usg at 22 weeks was unremarkable. None of the blood work reports are avaialble. Mom has diagnoses of schizophrenia and bipolar disease during pregnancy she is on Lamictal gabapentin, mirtazepine and haloperidol. Exposure of the medication to fetus in first trimester present. She reports to occasional alcohol intake. smoker with marijuana. No glucose tolerance test done. She reports rupture of membranes 2 days ago and came to hospital as labor pain worsened. Do not know about the social conditions as mom was in labor pain and could not be asked. Baby was delivered within an hour of admission. Baby cried immediately after birth. Brought to the warmer and started on cpap+5 30% increased to 100% for low saturations. MRSOPA done. Baby transferred to NICU on cpap+6 and 100%.

## 2025-03-24 NOTE — OB PROVIDER DELIVERY SUMMARY - NSSELHIDDEN_OBGYN_ALL_OB_FT
[NS_DeliveryAttending1_OBGYN_ALL_OB_FT:[id number]],[NS_DeliveryAssist1_OBGYN_ALL_OB_FT:[id number]_DeliveryAssist2_OBGYN_ALL_OB_FT:[id number]],[NS_DeliveryRN_OBGYN_ALL_OB_FT:[id number]]

## 2025-03-24 NOTE — DISCHARGE NOTE OB - FINANCIAL ASSISTANCE
[facility] provides services at a reduced cost to those who are determined to be eligible through [facility]’s financial assistance program. Information regarding [facility]’s financial assistance program can be found by going to [email or web address] or by calling [phone].

## 2025-03-24 NOTE — OB PROVIDER H&P - ASSESSMENT
A/P: 34y  at 31w1d GA by US not consistent with LMP who presents to OB Triage complaining of painful contractions and leakage of fluid 2 days ago. She is being admitted in active labor.     -Admit to L&D  -Consent  -Admission labs  -IV fluids  -Fetus: Tracing currently discontinuous due to patient movement/discomfort. Continuous toco and fetal monitoring.   -GBS: Unknown   -Analgesia: Patient requests epidural    Discussed with Dr. Edward    Signed: Chante Santos MD (PGY1)  A/P: 34y  at 31w1d GA by US not consistent with LMP who presents to OB Triage complaining of painful contractions and leakage of fluid 2 days ago. She is being admitted in active PTL.     -Admit to L&D  -Consent  -Admission labs  -IV fluids  -Fetus: Tracing currently discontinuous due to patient movement/discomfort. Continuous toco and fetal monitoring.   -GBS: Unknown   -Analgesia: Patient requests epidural    Discussed with Dr. Edward    Signed: Chante Santos MD (PGY1)  A/P: 34y  at 31w1d GA by US not consistent with LMP who presents to OB Triage complaining of painful contractions and leakage of fluid 2 days ago. She is being admitted in active PTL.     -Admit to L&D  -Consent  -Admission labs  -IV fluids  -Fetus: Tracing currently discontinuous due to patient movement/discomfort. Continuous toco and fetal monitoring.   -GBS: Unknown   -Analgesia: Patient requests epidural    Discussed with Dr. Edward    Signed: Chante Santos MD (PGY1)     Addendum:    Subjective Hx and Physical Exam reviewed.  I agree with the Resident Physician's assessment and plan of care, as discussed above.  R/B/A of admission for labor management, vaginal delivery with possible  section discussed at length, including medications and options for pain management.  She has no Denominational or personal objection to blood transfusion, if necessary.  She was given the opportunity to ask questions and all were addressed.  She understands the plan of care.    Rafi Edward, DO

## 2025-03-24 NOTE — OB PROVIDER H&P - HISTORY OF PRESENT ILLNESS
34y  at 31w1d GA by US not consistent with LMP who presents to OB Triage complaining of painful contractions and leakage of fluid 2 days ago. She reports good fetal movement and denies vaginal bleeding. No other complaints at this time.    Pregnancy complicated by: AMA, late PNC (19w), BPD/Schizophrenia    CESIA: 25  LMP: 24    POB: , NSVDx1, induced Ab x2 with D&C   PGYN: Denies hx fibroids, ovarian cysts, STIs, abnormal PAPs   PMH: Bipolar Disorder, Schizophrenia   PSH: breast augmentation, foot sgy  SH: Denies EtOH, tobacco and illicit drug use during this pregnancy; lives with roommate; her other child is living with her mother   Meds: remeron 15 - per chart review patient is also prescribed gabapentin 300, lamictal 150, which she states she was taken off of during this pregnancy   Allergies: NKDA 34y  at 31w1d GA by US not consistent with LMP who presents to OB Triage complaining of painful contractions and leakage of fluid 2 days ago. She reports good fetal movement and denies vaginal bleeding. No other complaints at this time.    Pregnancy complicated by: AMA, late PNC (19w), BPD/Schizophrenia    CESIA: 25  LMP: 24    POB: , NSVDx1, induced Ab x2 with D&C   PGYN: Denies hx fibroids, ovarian cysts, STIs, abnormal PAPs   PMH: Bipolar Disorder, Schizophrenia   PSH: breast augmentation, foot sgy  SH: Denies EtOH, tobacco and illicit drug use during this pregnancy; lives with roommate; her other child is living with her mother   Meds: remeron 15, monthly haldol injections - per chart review patient is also prescribed gabapentin 300, lamictal 150, which she states she was taken off of during this pregnancy   Allergies: NKDA

## 2025-03-24 NOTE — BH CONSULTATION LIAISON ASSESSMENT NOTE - NSSUICPROTFACT_PSY_ALL_CORE
Responsibility to children, family, or others/Identifies reasons for living/Supportive social network of family or friends/Fear of death or the actual act of killing self/Cultural, spiritual and/or moral attitudes against suicide/Engaged in work or school/Positive therapeutic relationships/Jew beliefs

## 2025-03-24 NOTE — OB RN PATIENT PROFILE - NS_BABIESUTERO_OBGYN_ALL_OB_NU
Care Management Follow Up    Length of Stay (days): 5    Expected Discharge Date: 04/20/2022     Concerns to be Addressed: discharge planning     Patient plan of care discussed at interdisciplinary rounds: Yes    Anticipated Discharge Disposition:  Home with family     Anticipated Discharge Services:    Anticipated Discharge DME:      Patient/family educated on Medicare website which has current facility and service quality ratings:  NA  Education Provided on the Discharge Plan:  yes  Patient/Family in Agreement with the Plan:      Referrals Placed by CM/SW:    Private pay costs discussed: Not applicable    Additional Information:  SW met with pt in pt room. Pt has lower oxygen needs and is declining TCU. Pt states she has been going to [facility] for outpatient PT and plans to return home with family and outpatient therapy. CM to follow for recommendations. Pt may need home infusion.  Family to transport.       NOEMI Ivan       1

## 2025-03-24 NOTE — DISCHARGE NOTE OB - NS MD DC FALL RISK RISK
For information on Fall & Injury Prevention, visit: [email or web address] OR  [email or web address] OR  https://www.cdc.gov/steadi/patient.html

## 2025-03-24 NOTE — DISCHARGE NOTE OB - PATIENT PORTAL LINK FT
You can access the FollowMyHealth Patient Portal offered by [facility] by registering at the following website: [email or web address]. By joining [facility]’s FollowMyHealth portal, you will also be able to view your health information using other applications (apps) compatible with our system.

## 2025-03-24 NOTE — OB RN PATIENT PROFILE - NS_GBS_INFANT_INVASIVE_OBGYN_ALL_OB_FT
ORTHOPEDIC SURGERY CONSULTATION  Justin Medina DO    CONSULTING PHYSICIAN:  Evelyn Bishop APNP    REASON FOR CONSULT / CHIEF COMPLAINT:    Chief Complaint   Patient presents with   • Hip Pain     Bilateral Hip pain, left worse than right. having numbness when that happens its very painful 4-7/10 taking Celebrex,replaced 13 years ago. Tried Physical Therapy.         HISTORY OF PRESENT ILLNESS:  Ms. Ross is a 51 year old female who presents today for consultation of her Hip Pain (Bilateral Hip pain, left worse than right. having numbness when that happens its very painful 4-7/10 taking Celebrex,replaced 13 years ago. Tried Physical Therapy. )  .  Patient was seen at the request of Evelyn Bishop APNP.        REVIEW OF SYSTEMS:  A complete review of systems returned positive for complaints listed in HPI but is otherwise negative for general, eyes, nose, sinus, respiratory, cardiac, mouth, ears, gastrointestinal, skin, genitourinary, neurologic, musculoskeletal, heme, lymphatic, endocrine and psychiatric disorders.      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   • Chronic pain    • Essential hypertension 8/21/2015   • Fibromyalgia    • High cholesterol    • Hip dysplasia, congenital (CMD) 2010    with secondary degenerative changes   • Hip dysplasia, congenital (CMD)    • Other chronic allergic conjunctivitis    • Otitis media    • Pituitary adenoma  (CMD)     Pituitary Surgery 12/21/16   • Reduced vision    • Rosacea    • Sinusitis, chronic    • Sleep apnea    • Spinal headache    • Type 2 diabetes mellitus without complication  (CMD) 11/19/2015   • Urinary tract infection    • Urticaria    • Vitamin D deficiency        Past Surgical History:   Procedure Laterality Date   • Carpal tunnel release  10/2010    and Cubital Tunnel Surgery   • Carpal tunnel release  12/29/2014    left. And left cubital tunnel  release   • Elbow surgery  01/26/2015    right elbow cubital tunnel release   • Hardware removal Left 04/2002    Bob Villatoro, Left pelvic/hip hardware removal from prior pelvic osteotomy   • No past surgeries     • Osteotomy  1993    Lisa Man Periacetabular osteotomy   • Pituitary surgery  12/21/2016    Pituitary adenoma with features of apoplexy   • Total hip replacement Left 08/15/2011    Dr Roberson, left BILL with acetabular reconstruction   • Total hip replacement Right 09/30/2011    Dr. Roberson, right BILL        Social History     Socioeconomic History   • Marital status: /Civil Union     Spouse name: Not on file   • Number of children: 3   • Years of education: Not on file   • Highest education level: Not on file   Occupational History   • Occupation: [employer] Department     Employer: [employer]   Tobacco Use   • Smoking status: Never     Passive exposure: Never   • Smokeless tobacco: Never   Vaping Use   • Vaping status: never used   Substance and Sexual Activity   • Alcohol use: Yes     Alcohol/week: 2.0 standard drinks of alcohol     Types: 2 Standard drinks or equivalent per week   • Drug use: Never   • Sexual activity: Yes     Partners: Male     Birth control/protection: OCP   Other Topics Concern   •  Service Not Asked   • Blood Transfusions Not Asked   • Caffeine Concern Not Asked   • Occupational Exposure Not Asked   • Hobby Hazards Not Asked   • Sleep Concern Not Asked   • Stress Concern Not Asked   • Weight Concern Not Asked   • Special Diet Not Asked   • Back Care Not Asked   • Exercise Not Asked   • Bike Helmet Not Asked   • Seat Belt Yes   • Self-Exams Yes   Social History Narrative    Smoking:    None        Alcohol:    Occasional, social        Illicit Drugs:    None        Allergies:     -- Derma-Aid -- RASH and Other (See Comments)      --  Patient has allergy to Dermabond surgical glue,               develops blisters     -- Acetaminophen  -- PRURITUS     -- Ancef [Cefazolin Sodium] -- RASH     -- Cleocin [Clindamycin] -- RASH     -- Oxycodone -- GI UPSET     -- Penicillins -- HIVES        Past Medical History:    Allergic rhinitis, cause unspecified    Anemia    Arthritis    Carpal tunnel syndrome    Central hypothyroidism    Chronic pain    Essential hypertension    Fibromyalgia.    High cholesterol    Hip dysplasia, congenital with secondary degenerative changes    Pituitary adenoma - Pituitary Surgery 16    Rosacea    Sinusitis, chronic    Sleep apnea on Cpap    Spinal headache    Type 2 diabetes mellitus without complication    Vitamin D deficiency        Past Surgical History:    Carpal tunnel release     Left carpal tunnel release 2014    Right elbow cubital tunnel release 1/26/15    Pituitary adenoma with features of apoplexy 2016. Subsequently the patient developed hypopituitarism and started on thyroid and  adrenal supplementations.    Pelvic osteotomy 2011    Left hip arthroplasty with acetabular reconstruction 8/15/2011    Right hip arthroplasty 2011        Family History:    M  at age 59  Lung cancer and lymphoma.    F alive,age 75 Parkinson's, diverticulosis, and cataracts, brain shunt    B alive age 51 healthy    S alive a50 healthyge         Social History:     for 22 years.    Children 3    Lives in [place], [place]    [employer] , college grad in criminal justice . [facility] [employer] [facility]    Has 2 jobs, part time at [employer].            Previous Procedures:    Mammogram 2/17/15 normal    CT Head 16 Chronic inflammatory changes in the left mastoid air cells. There is mucoperiosteal thickening and moderate opacification of the sphenoid sinuses.    MRI Angio Brain without contrast 16 2 cm sellar/suprasellar mass with mass effect on the optic chiasm and medial left cavernous sinus invasion, as described above. The findings are likely related to pituitary macroadenoma.         Vaccinations:    Tdap 10/14/2010     Social Determinants of Health     Financial Resource Strain: Not on file   Food Insecurity: Not on file   Transportation Needs: Not on file   Physical Activity: Not on file   Stress: Not on file   Social Connections: Not on file   Interpersonal Safety: Not At Risk (3/28/2021)    Interpersonal Safety    • Social Determinants: Intimate Partner Violence Past Fear: Not on file    • Social Determinants: Intimate Partner Violence Current Fear: Not on file       Current Outpatient Medications   Medication Sig   • amLODIPine (NORVASC) 10 MG tablet Take 1 tablet by mouth daily.   • Semaglutide, 2 MG/DOSE, (Ozempic, 2 MG/DOSE,) 8 MG/3ML Solution Pen-injector Inject 2 mg into the skin every 7 days. Indications: Type 2 Diabetes   • cetirizine (ZyrTEC) 10 MG tablet Take 1 tablet by mouth in the morning and 1 tablet in the evening.   • hydroCORTisone (CORTEF) 10 MG tablet Take 2 tablets by mouth every morning AND 1 tablet every afternoon.   • metFORMIN (GLUCOPHAGE-XR) 500 MG 24 hr tablet Take 2 tablets by mouth in the morning and 2 tablets in the evening.   • desmopressin (DDAVP) 0.1 MG tablet Take 2 tablets by mouth every morning AND 1 tablet every evening.   • levothyroxine 125 MCG tablet Take 1 tablet by mouth daily.   • norethindrone-ethinyl estradiol (Nortrel 0.5/35, 28,) 0.5-35 MG-MCG per tablet Take 1 tablet by mouth daily.   • losartan-hydrochlorothiazide (HYZAAR) 100-12.5 MG per tablet Take 1 tablet by mouth daily.   • blood glucose (OneTouch Ultra) test strip Test blood sugar one times daily. Diagnosis: E11.9. Meter: OneTouch Ultra   • amitriptyline (ELAVIL) 50 MG tablet Take 1 tablet by mouth nightly.   • pravastatin (PRAVACHOL) 10 MG tablet Take 1 tablet by mouth every evening.   • metoPROLOL succinate (TOPROL-XL) 50 MG 24 hr tablet Take 1 tablet by mouth daily.   • celecoxib (CeleBREX) 100 MG capsule Take 1 capsule by mouth 2 times daily as needed for Pain.   • scopolamine  (TRANSDERM_SCOP) 1 MG/3DAYS patch Place 1 patch onto the skin every 72 hours as needed (vertigo).   • Blood Glucose Monitoring Suppl (ONE TOUCH ULTRA 2) w/Device Kit Check blood sugars once daily and nightly as needed.   • Lancets (OneTouch Delica Plus [id number]) Misc 1 each daily. Test blood sugar one times daily. Diagnosis: E11.9. Meter: OneTouch Ultra   • naltrexone 4.5 mg capsule Take 1 capsule by mouth at bedtime.   • EPINEPHrine 0.3 MG/0.3ML auto-injector Inject 0.3 mLs into the muscle 1 time as needed for Anaphylaxis.   • azithromycin (ZITHROMAX) 500 MG tablet TK 1 T PO 1 HOUR BEFORE DENTAL APPOINTMENT   • BIOTIN PO Take by mouth daily.   • docusate sodium-sennosides (SENOKOT S) 50-8.6 MG per tablet Take 2 tablets by mouth daily as needed for Constipation. (Patient taking differently: Take 1 tablet by mouth daily.)   • Potassium 99 MG Tab Take 1 tablet by mouth daily.   • cholecalciferol (VITAMIN D3) 1000 UNITS tablet Take 2,000 Units by mouth daily.   • magnesium gluconate (MAGONATE) 500 MG tablet Take 250 mg by mouth daily.     No current facility-administered medications for this visit.       ALLERGIES:   Allergen Reactions   • Mecrylate Other (See Comments)     blisters   • Derma-Aid RASH and Other (See Comments)     Patient has allergy to Dermabond surgical glue, develops blisters   • Acetaminophen PRURITUS     Has retried taking with same reaction   • Ancef [Cefazolin Sodium] RASH   • Cleocin [Clindamycin] RASH   • Oxycodone GI UPSET   • Penicillins HIVES       Family History   Problem Relation Age of Onset   • Cancer, Lung Mother         and Lymphoma   • Gastrointestinal Father         Diverticulosis   • Cataracts Father    • Parkinsonism Father    • Other Father         hydrocephalis   • Glaucoma Father    • Eczema Father    • Patient is unaware of any medical problems Sister    • Patient is unaware of any medical problems Brother    • Thyroid Daughter    • Other Daughter         mold   • Patient is  unaware of any medical problems Son    • Aneurysm Maternal Grandmother    • Patient is unaware of any medical problems Maternal Grandfather    • Postmenopausal breast cancer Paternal Grandmother 70   • Heart disease Paternal Grandmother    • Diabetes Paternal Grandmother    • Patient is unaware of any medical problems Paternal Grandfather    • Patient is unaware of any medical problems Paternal Aunt    • Patient is unaware of any medical problems Maternal Aunt    • Patient is unaware of any medical problems Paternal Uncle    • Patient is unaware of any medical problems Maternal Uncle    • Patient is unaware of any medical problems Other    • Amblyopia Neg Hx    • Blindness Neg Hx    • Cancer Neg Hx    • Hypertension Neg Hx    • Kidney disease Neg Hx    • Systemic Lupus Erythematosus Neg Hx    • Macular degeneration Neg Hx    • Retinal detachment Neg Hx    • Sjogren's Syndrome Neg Hx    • Strabismus Neg Hx    • Stroke Neg Hx    • Tuberculosis Neg Hx        HOME MEDICATIONS:  No outpatient medications have been marked as taking for the 8/6/24 encounter (Office Visit) with Justin Medina DO.        PROBLEM LIST:  Patient Active Problem List   Diagnosis   • Other chronic allergic conjunctivitis   • Rosacea   • Fibromyalgia   • Vitamin D deficiency   • Livedo reticularis   • Sleep apnea, obstructive   • Hallux valgus   • Astigmatism of left eye   • Presbyopia OU   • Inflammatory arthritis   • Chronic pain   • Cubital tunnel syndrome on right   • Cubital tunnel syndrome on left   • Carpal tunnel syndrome on left   • CMC arthritis   • Hyperopia with astigmatism and presbyopia   • Type 2 diabetes mellitus without complication  (CMD)   • Chronic insomnia   • Hyperlipidemia with target LDL less than 100   • Chronic pain of both hips   • Pituitary macroadenoma  (CMD)   • Panhypopituitarism  (CMD)   • Central hypothyroidism   • Adrenal insufficiency  (CMD)   • Obesity, Class III, BMI 40-49.9 (morbid obesity) (CMS/[facility])   •  Diabetes insipidus  (CMD)   • Tremor   • Benign essential HTN   • Positive SHARON (antinuclear antibody)   • Allergic rhinitis due to dust mite, dog, cat, tree, mold - spt 2013   • Benign joint hypermobility syndrome          PHYSICAL EXAMINATION:       VITAL SIGNS:  Visit Vitals  Resp 16   Ht 5' 2\" (1.575 m)   Wt 106.6 kg (235 lb)   LMP 07/09/2024 (Approximate)   BMI 42.98 kg/m²       MUSCULOSKELETAL:    Physical Exam:    Constitutional  General appearance   Overall: Well nourished, well developed and in no acute distress.    Cardiovascular  Examination of vasculature   Overall: No clubbing, cyanosis, edema; good capillary refill and warm extremities.    Musculoskeletal  Gait and station   Overall: No ataxia, normal station and good posture.   Conventional walking: painful    MS: Bilateral Lower Extremities  Inspection & palpation - Lower Extremity   Overall: Normal appearance of legs and no crepitus or defects.  Range of motion - Lower Extremity   Overall: full range of motion in knees, full range of motion in ankles and full range of motion in toes.  Stability - Lower Extremity   Overall: stable knees, ankles and feet and no subluxation, dislocation.  Strength & tone - Lower Extremity  Overall: full strength in LE and normal LE bulk and tone.    MS: Spine/Rib/Pelvis  Inspection & palpation - Spine/ribs/pelvis   Overall: Hip no tenderness to palpation without crepitus or defects and positive tenderness to palpation greater trochanter.    Bilateral Range of motion - S/R/P       Hip flexion : Not painful hip flexion.       Hip internal rotation : Not painful hip internal rotation.        Hip external rotation : Not painful hip external rotation.  Stability - Spine/ribs/pelvis   Overall: No dislocation, subluxation, or laxity.  Strength & tone - spine/ribs/pelvis   Overall: Full strength in hips and normal hip bulk and tone.    Neurological  Sensation   Overall: intact to light touch bilateral lower  extremities.        RADIOGRAPHS:   Radiology Report: Hip Replacement  X-rays Bilateral hip--weightbearing AP and frog lateral view    Comparison films: None    Findings: Hip status post arthroplasty procedure without evidence of failure. No fractures are appreciated.  No leg length discrepancy noted on the AP pelvis films.      Impression: Stable Hip arthroplasty         Diagnosis:  Hip pain, Bilateral  History of a total hip arthroplasty, Bilateral  Body mass index is 42.98 kg/m². Morbid obesity (BMI >40 or 35+ and a comorbid condition)    Assessment:  The patient presents for encounter with history and physical exam findings consistent with history of a total hip arthroplasty, Bilateral hip.  A psoas tendon injection has been ordered for the left hip. If the injection does not provide any pain relief, we will order an MRI of the lumbar spine.     Plan:    -- Psoas tendon injection ordered   -- Activity Modification--avoid long walking activity.  -- A light low-impact exercise program was recommended.  -- Mobic 15 mg daily was not prescribed.    -- Follow-up as needed.  -- Patient advised to call with any questions or concerns.      All of the patient's questions were answered.    On 8/6/2024, Anni FUENTES scribed the services personally performed by Dr. Justin Medina.      Documentation recorded by the scribe accurately and completely reflects service(s) I personally performed and the decisions made by me.    CC: Evelyn Bishop APNP Hiemstra, Amanda J, APNP    8/11/2024  2:50 PM         Patient states no history

## 2025-03-24 NOTE — OB RN PATIENT PROFILE - NSICDXPASTMEDICALHX_GEN_ALL_CORE_FT
PAST MEDICAL HISTORY:  Anxiety and depression     Bipolar disorder     History of augmentation of both breasts     Other pancreatitis     S/P foot surgery, left

## 2025-03-24 NOTE — OB RN DELIVERY SUMMARY - NSSELHIDDEN_OBGYN_ALL_OB_FT
[NS_[email or web address]_ALL_OB_FT:[email or web address]],[NS_DeliveryAssist1_OBGYN_ALL_OB_FT:[id number]],[NS_DeliveryAssist2_OBGYN_ALL_OB_FT:[id number]=],[NS_DeliveryRN_OBGYN_ALL_OB_FT:[id number]]

## 2025-03-24 NOTE — DISCHARGE NOTE OB - HOSPITAL COURSE
She is a 35yo now  who presented at 31w1d GA in active  labor and had a normal delivery. She had a normal postpartum course. Patient was seen by Psych and restarted on home meds which she had self-discontinued during the pregnancy. She was discharged home in stable condition.

## 2025-03-24 NOTE — DISCHARGE NOTE OB - KEGEL (VAGINAL TIGHTENING) EXERCISES TO PROMOTE HEALING
"Chief Complaint   Patient presents with     New Patient     Rigth foot pain- 2nd toe     Pt does cross country running, foot has hurt for 4 weeks and ran on it for a week after the foot started hurting. Pain: 510-walking 6-7/10-when running and 0/10 when sitting   Vitals:    10/19/17 1603   BP: 109/70   Pulse: 78   Weight: 44.9 kg (98 lb 14.4 oz)   Height: 1.683 m (5' 6.25\")       Body mass index is 15.84 kg/(m^2).      Asmita Anguiano, [facility]                          "
Statement Selected

## 2025-03-24 NOTE — BH CONSULTATION LIAISON ASSESSMENT NOTE - SUMMARY
This is a 34 year old female with no significant PMHx, PPHx of bipolar disorder, cannabis abuse, at least 2 past [facility] admissions (last in January 2022 at [facility] for ~2 months due to mood symptoms with psychosis), no past SAs but hx of SI, followed by [facility] since her discharged from [facility] in 2022, currently unemployed but receives social security benefits, has one daughter (18) who lives with the patient's mom. She is currently admitted following giving birth to a premature baby (31 weeks) and Psychiatry was consulted for medication management.    Patient appears as though she has been at her baseline as reported by both herself and her outpatient psychiatrist, as described in the HPI. She has not taken her Lamictal or Gabapentin since she found out that she was pregnant at 20 weeks (about 11 weeks ago given that the baby was born prematurely at 31 weeks) but has remained compliant with the Haldol dec (dose was decreased to 75 mg at the last time she was due to receive the injection on March 13th as outpatient psychiatrist wanted to avoid anything else that would aggravate an already high risk pregnancy) and Mirtazapine 15 mg qhs. At this time, I would recommend restarting her Lamictal at 25 mg daily given the propensity for Lamictal to cause Tristan Jomar Syndrome even on re-exposure and restart the Gabapentin at 300 mg qhs while also continuing the Mirtazapine at 15 mg qhs. Her outpatient psychiatrist agrees to administer the Haldol dec 100 mg at their next appointment when she is discharged and would recommend allowing her outpatient psychiatrist to titrate the Lamictal and Gabapentin to their original doses.

## 2025-03-24 NOTE — DISCHARGE NOTE OB - CARE PROVIDER_API CALL
Tiffanie Neves  Obstetrics and Gynecology  [address] [place] [id number]  Phone: [phone]  Fax: [phone]  Established Patient  Follow Up Time: 2 weeks

## 2025-03-24 NOTE — DISCHARGE NOTE OB - MEDICATION SUMMARY - MEDICATIONS TO TAKE
I will START or STAY ON the medications listed below when I get home from the hospital:    ibuprofen 600 mg oral tablet  -- 1 tab(s) by mouth every 6 hours MDD: 4  -- Indication: For pain    acetaminophen 325 mg oral tablet  -- 3 tab(s) by mouth every 6 hours MDD: 4  -- Indication: For pain    gabapentin 300 mg oral capsule  -- 1 cap(s) by mouth once a day  -- Indication: For Home med    LaMICtal 25 mg oral tablet  -- 1 tab(s) by mouth once a day  -- Indication: For Home med    mirtazapine 15 mg oral tablet  -- 1 tab(s) by mouth once a day  -- Indication: For Home med   I will START or STAY ON the medications listed below when I get home from the hospital:    ibuprofen 600 mg oral tablet  -- 1 tab(s) by mouth every 6 hours MDD: 4  -- Indication: For pain    acetaminophen 325 mg oral tablet  -- 3 tab(s) by mouth every 6 hours MDD: 4  -- Indication: For pain    gabapentin 300 mg oral capsule  -- 1 cap(s) by mouth 2 times a day  -- Indication: For Home med    LaMICtal 25 mg oral tablet  -- 1 tab(s) by mouth once a day  -- Indication: For Home med    mirtazapine 15 mg oral tablet  -- 1 tab(s) by mouth once a day  -- Indication: For Home med

## 2025-03-24 NOTE — OB RN PATIENT PROFILE - NSLDARRIVAL_OBGYN_ALL_OB_START_DATE
Video Esophagram Review Rounds  Imaging Review of MBSS conducted with attending physician Diana Oviedo and reviewed/discussed with SLP Shivani Boykin, Amie Gutierrez, Gayle Tony, and/or Gita Mariscal    Relevant Background:    Esophagram: 3/6/23  1. Long segment under distention of the thoracic esophagus, concerning  for long segment stricture. Superimposed short segment high-grade  narrowing of the cervical esophagus near C7 with associated holdup of  barium pill.  2. Indwelling distal esophageal stent with change in caliber and  holdup at the inferior distal end of the stent, concerning for  high-grade stricture.  3. Small focal outpouchings along the cervical esophagus and along the  midthoracic esophagus, possible more likely diverticulum versus  contained leak. No large volume extravasation demonstrated.      MBSS Date: 3/6/23    Findings:  Pharyngeal Weakness:No  Epiglottic dysfunction: No  Penetration: No  Aspiration: No  UES dysfunction: Yes  Details: some mild UES narrowing with outpouchings      Recommendations:  Diet:                  
24-Mar-2025 00:23

## 2025-03-24 NOTE — OB PROVIDER DELIVERY SUMMARY - NS_BEFORE39WEEKS_OBGYN_ALL_OB
----- Message from Endy Rivers sent at 5/20/2020  2:56 PM CDT -----  Contact: pt: [phone]  Pt is calling to speak with nurse, state she feel like she is getting a UTI and would like to give a urine sample, complain of bladder pressure, urinary frequency, & slight burning         Please contact pt: [phone]   Yes

## 2025-03-24 NOTE — BH CONSULTATION LIAISON ASSESSMENT NOTE - VIOLENCE PROTECTIVE FACTORS:
Residential stability/Affective Stability/Insight into violence risk and need for management/treatment/Good treatment response/compliance

## 2025-03-25 PROCEDURE — 99232 SBSQ HOSP IP/OBS MODERATE 35: CPT

## 2025-03-25 RX ORDER — GABAPENTIN 400 MG/1
1 CAPSULE ORAL
Qty: 30 | Refills: 0
Start: 2025-03-25 | End: 2025-04-23

## 2025-03-25 RX ORDER — ACETAMINOPHEN 500 MG/5ML
3 LIQUID (ML) ORAL
Qty: 180 | Refills: 0
Start: 2025-03-25 | End: 2025-04-08

## 2025-03-25 RX ORDER — IBUPROFEN 200 MG
1 TABLET ORAL
Qty: 60 | Refills: 0
Start: 2025-03-25 | End: 2025-04-08

## 2025-03-25 RX ORDER — LAMOTRIGINE 150 MG/1
1 TABLET ORAL
Qty: 30 | Refills: 0
Start: 2025-03-25 | End: 2025-04-23

## 2025-03-25 RX ORDER — MIRTAZAPINE 30 MG/1
1 TABLET, FILM COATED ORAL
Qty: 30 | Refills: 0
Start: 2025-03-25 | End: 2025-04-23

## 2025-03-25 RX ORDER — GABAPENTIN 400 MG/1
1 CAPSULE ORAL
Qty: 60 | Refills: 0
Start: 2025-03-25 | End: 2025-04-23

## 2025-03-25 RX ORDER — MELATONIN 5 MG
5 TABLET ORAL AT BEDTIME
Refills: 0 | Status: DISCONTINUED | OUTPATIENT
Start: 2025-03-25 | End: 2025-03-27

## 2025-03-25 RX ORDER — LAMOTRIGINE 150 MG/1
0 TABLET ORAL
Refills: 0 | DISCHARGE

## 2025-03-25 RX ORDER — GABAPENTIN 400 MG/1
0 CAPSULE ORAL
Refills: 0 | DISCHARGE

## 2025-03-25 RX ORDER — HALOPERIDOL 10 MG/1
0 TABLET ORAL
Refills: 0 | DISCHARGE

## 2025-03-25 RX ORDER — MIRTAZAPINE 30 MG/1
0 TABLET, FILM COATED ORAL
Refills: 0 | DISCHARGE

## 2025-03-25 RX ADMIN — Medication 975 MILLIGRAM(S): at 02:51

## 2025-03-25 RX ADMIN — Medication 5 MILLIGRAM(S): at 21:15

## 2025-03-25 RX ADMIN — Medication 975 MILLIGRAM(S): at 21:14

## 2025-03-25 RX ADMIN — MIRTAZAPINE 15 MILLIGRAM(S): 30 TABLET, FILM COATED ORAL at 21:15

## 2025-03-25 RX ADMIN — LAMOTRIGINE 25 MILLIGRAM(S): 150 TABLET ORAL at 13:11

## 2025-03-25 RX ADMIN — Medication 600 MILLIGRAM(S): at 00:01

## 2025-03-25 NOTE — PROGRESS NOTE ADULT - SUBJECTIVE AND OBJECTIVE BOX
ZACHARY BRENNER is a 34y  now PPD# 1 s/p  at 31w1d EGA due to PTL.     Subjective:    No acute events overnight.  Patient reports difficulty sleeping at night- requests melatonin.   Pain is well controlled.  +flatus, + voiding.  Ambulating and tolerating PO.  Appropriate lochia.  Denies fever, chills, nausea, and vomiting.  She denies lightheadedness, dizziness, HA, blurry vision, palpitations, chest pain and SOB.     Objective:    T(C): 36.7 (25 @ 04:00), Max: 36.7 (25 @ 08:00)  HR: 63 (25 @ 04:00) (63 - 65)  BP: 121/74 (25 @ 04:00) (119/73 - 121/74)  RR: 18 (25 @ 04:00) (18 - 18)  SpO2: 97% (25 @ 04:00) (97% - 98%)    Physical exam:  General: AOx3, NAD.  Abdomen: Soft, appropriately tender to palpitation, fundus firm.  Vaginal: expectant lochia  Ext: No DVT signs, warm extremities.                          11.2   x     )-----------( x        ( 24 Mar 2025 11:45 )             31.9

## 2025-03-25 NOTE — BH CONSULTATION LIAISON PROGRESS NOTE - NSBHFUPINTERVALHXFT_PSY_A_CORE
No acute events overnight, no psychiatric PRNs required. I met with the patient at the bedside, baby's father was also present. She reports stable mood and anxiety levels, denies SI/HI, AVH and other perceptual disturbances, does not endorse thoughts of wanting to harm her baby or delusions regarding her baby. She tolerated the medications that were started for her yesterday without noticeable side effects but reports that she had a very hard time falling asleep last night. She is amenable to increasing her Gabapentin to 300 mg bid and continuing Remeron at 15 mg qhs and starting Melatonin 5 mg qhs. She is understanding that the Lamictal will be titrated by her outpatient psychiatrist given that she has been off the medication for a few weeks now and that there is a risk of SJS. She is understanding that the Haldol dec will be administered to her outpatient. She is apprehensive about having to change her living situation but is ready to do what needs to be done "for the sake of my baby," including not resuming consumption of cannabis. No other complaints from her or the baby's father.

## 2025-03-25 NOTE — BH CONSULTATION LIAISON PROGRESS NOTE - NSBHCHARTREVIEWVS_PSY_A_CORE FT
Vital Signs Last 24 Hrs  T(C): 36.7 (25 Mar 2025 04:00), Max: 36.7 (25 Mar 2025 04:00)  T(F): 98.1 (25 Mar 2025 04:00), Max: 98.1 (25 Mar 2025 04:00)  HR: 63 (25 Mar 2025 04:00) (63 - 63)  BP: 121/74 (25 Mar 2025 04:00) (121/74 - 121/74)  BP(mean): --  RR: 18 (25 Mar 2025 04:00) (18 - 18)  SpO2: 97% (25 Mar 2025 04:00) (97% - 97%)    Parameters below as of 25 Mar 2025 04:00  Patient On (Oxygen Delivery Method): room air

## 2025-03-25 NOTE — PROGRESS NOTE ADULT - ASSESSMENT
Assessment/Plan:  34y  now PPD# 1 s/p  at 31w1d EGA due to PTL.     #Routine post partum care  - Stable, doing well postpartum  - Hgb 11.2 > am labs pending   - Pain: well controlled on PO pain meds  - GI: regular diet, normal bowel function  - : voiding, lochia decreasing  - DVT ppx: SCDs, ambulation encouraged  - Healthy baby M   - Melatonin ordered     Dispo: Pt is stable, doing well and meeting all postpartum milestones. Possible discharge to home today pending attending approval and pending [facility] for housing.     Signed: Chante Santos MD (PGY1)

## 2025-03-25 NOTE — BH CONSULTATION LIAISON PROGRESS NOTE - NSBHASSESSMENTFT_PSY_ALL_CORE
Patient appears to be at her baseline and is in agreement with the plan to continue her medications as described in HPI (increasing Gabapentin to 300 mg bidprn which can be consolidated into a one time dose of 600 mg at bedtime if she is having a hard time sleeping, continuing Remeron at 15 mg qhs, starting Melatonin 5 mg qhs and continuing to slowly titrate Lamictal back to her former dose of 150 mg daily as it is currently at 25 mg daily). No acute safety concerns are appreciated or reported by her or the baby's father, patient denies SI/HI, AVH and other perceptual disturbances. No indication for acute psychiatric intervention at this time; patient requires  to coordinate housing for her. Psychiatry will sign off, please reconsult if any need arises. Patient appears to be at her baseline and is in agreement with the plan to continue her medications as described in HPI (increasing Gabapentin to 300 mg bidprn which can be consolidated into a one time dose of 600 mg at bedtime if she is having a hard time sleeping, continuing Remeron at 15 mg qhs, starting Melatonin 5 mg qhs and continuing to slowly titrate Lamictal back to her former dose of 150 mg daily as it is currently at 25 mg daily and awaiting her next HAldol dec 100 mg around April 13th). No acute safety concerns are appreciated or reported by her or the baby's father, patient denies SI/HI, AVH and other perceptual disturbances. No indication for acute psychiatric intervention at this time; patient requires  to coordinate housing for her. Psychiatry will sign off, please reconsult if any need arises.

## 2025-03-25 NOTE — BH CONSULTATION LIAISON PROGRESS NOTE - CURRENT MEDICATION
MEDICATIONS  (STANDING):  acetaminophen     Tablet .. 975 milliGRAM(s) Oral <User Schedule>  diphtheria/tetanus/pertussis (acellular) Vaccine (Adacel) 0.5 milliLiter(s) IntraMuscular once  ibuprofen  Tablet. 600 milliGRAM(s) Oral every 6 hours  lamoTRIgine 25 milliGRAM(s) Oral daily  melatonin 5 milliGRAM(s) Oral at bedtime  mirtazapine 15 milliGRAM(s) Oral at bedtime  oxytocin Infusion 167 milliUNIT(s)/Min (167 mL/Hr) IV Continuous <Continuous>  prenatal multivitamin 1 Tablet(s) Oral daily  sodium chloride 0.9% lock flush 3 milliLiter(s) IV Push every 8 hours    MEDICATIONS  (PRN):  benzocaine 20%/menthol 0.5% Spray 1 Spray(s) Topical every 6 hours PRN for Perineal discomfort  dibucaine 1% Ointment 1 Application(s) Topical every 6 hours PRN Perineal discomfort  diphenhydrAMINE 25 milliGRAM(s) Oral every 6 hours PRN Pruritus  hydrocortisone 1% Cream 1 Application(s) Topical every 6 hours PRN Moderate Pain (4-6)  lanolin Ointment 1 Application(s) Topical every 6 hours PRN nipple soreness  magnesium hydroxide Suspension 30 milliLiter(s) Oral two times a day PRN Constipation  oxyCODONE    IR 5 milliGRAM(s) Oral every 3 hours PRN Moderate to Severe Pain (4-10)  oxyCODONE    IR 5 milliGRAM(s) Oral once PRN Moderate to Severe Pain (4-10)  pramoxine 1%/zinc 5% Cream 1 Application(s) Topical every 4 hours PRN Moderate Pain (4-6)  simethicone 80 milliGRAM(s) Chew every 4 hours PRN Gas  witch hazel Pads 1 Application(s) Topical every 4 hours PRN Perineal discomfort

## 2025-03-25 NOTE — BH CONSULTATION LIAISON PROGRESS NOTE - NSBHMSELANG_PSY_A_CORE
[FreeTextEntry1] : -Findings were reviewed and discussed with the patient in detail\par -Good aural hygiene reviewed\par -Patient may use wax removal drops as needed\par \par -Avoid noise exposure\par the audiogram was ordered by me and interpreted by me today and the results are as follows- Symmetric high-frequency sloping sensorineural hearing loss. Normal tympanograms. Normal speech discrimination scores.\par \par -She is a borderline candidate for amplification. She is not motivated for amplification at this time.\par -The patient was asked to call and return urgently if any problems\par -I will see the patient in follow up yearly. No abnormalities noted

## 2025-03-26 RX ADMIN — LAMOTRIGINE 25 MILLIGRAM(S): 150 TABLET ORAL at 12:04

## 2025-03-26 RX ADMIN — Medication 975 MILLIGRAM(S): at 21:27

## 2025-03-26 RX ADMIN — MIRTAZAPINE 15 MILLIGRAM(S): 30 TABLET, FILM COATED ORAL at 21:27

## 2025-03-26 RX ADMIN — Medication 5 MILLIGRAM(S): at 21:27

## 2025-03-26 RX ADMIN — Medication 600 MILLIGRAM(S): at 17:59

## 2025-03-26 RX ADMIN — Medication 600 MILLIGRAM(S): at 05:31

## 2025-03-26 RX ADMIN — Medication 600 MILLIGRAM(S): at 12:04

## 2025-03-26 RX ADMIN — Medication 1 TABLET(S): at 12:04

## 2025-03-26 NOTE — PROGRESS NOTE ADULT - SUBJECTIVE AND OBJECTIVE BOX
ZACHARY BRENNER is a 34y  now PPD# 2 s/p  at 31w1d EGA due to PTL.     Subjective:    No acute events overnight.  Reports improved sleep overnight with melatonin   Pain is well controlled.  +flatus, + voiding.  Ambulating and tolerating PO.  Appropriate lochia.  Denies fever, chills, nausea, and vomiting.  She denies lightheadedness, dizziness, HA, blurry vision, palpitations, chest pain and SOB.     Objective:    T(C): 36.7 (25 @ 04:00), Max: 36.7 (25 @ 08:00)  HR: 63 (25 @ 04:00) (63 - 65)  BP: 121/74 (25 @ 04:00) (119/73 - 121/74)  RR: 18 (25 @ 04:00) (18 - 18)  SpO2: 97% (25 @ 04:00) (97% - 98%)    Physical exam:  General: AOx3, NAD.  Abdomen: Soft, appropriately tender to palpitation, fundus firm.  Vaginal: expectant lochia  Ext: No DVT signs, warm extremities.                          11.2   x     )-----------( x        ( 24 Mar 2025 11:45 )             31.9                 ZACHARY BRENNER is a 34y  now PPD# 2 s/p  at 31w1d EGA due to PTL.     Subjective:    No acute events overnight.  Reports improved sleep overnight with melatonin   Pain is well controlled.  +flatus, + voiding.  Ambulating and tolerating PO.  Appropriate lochia.  Denies fever, chills, nausea, and vomiting.  She denies lightheadedness, dizziness, HA, blurry vision, palpitations, chest pain and SOB.     Objective:    Vital Signs Last 24 Hrs  T(C): --  T(F): --  HR: --  BP: --  BP(mean): --  RR: --  SpO2: --    Physical exam:  General: AOx3, NAD.  Abdomen: Soft, appropriately tender to palpitation, fundus firm.  Vaginal: expectant lochia  Ext: No DVT signs, warm extremities.                          11.2   x     )-----------( x        ( 24 Mar 2025 11:45 )             31.9

## 2025-03-26 NOTE — PROGRESS NOTE ADULT - ASSESSMENT
Assessment/Plan:  34y  now PPD# 2 s/p  at 31w1d EGA due to PTL.     #Routine post partum care  - Stable, doing well postpartum  - Hgb 11.4 > 11.2   - Pain: well controlled on PO pain meds  - GI: regular diet, normal bowel function  - : voiding, lochia decreasing  - DVT ppx: SCDs, ambulation encouraged  - Healthy baby M   - Melatonin qhs for sleep  - Melatonin 15 qhs, lamictal 25, gabapentin 300 BID per psych recs     Dispo: Pt is stable, doing well and meeting all postpartum milestones. Possible discharge to home today pending attending approval and pending [facility] for housing.  Assessment/Plan:  34y  now PPD# 2 s/p  at 31w1d EGA due to PTL.     #Routine post partum care  - Doing well postpartum  - No vitals charted for last 24h- nursing made aware and to input record   - Hgb 11.4 > 11.2   - Pain: well controlled on PO pain meds  - GI: regular diet, normal bowel function  - : voiding, lochia decreasing  - DVT ppx: SCDs, ambulation encouraged  - Healthy baby M   - Melatonin qhs for sleep  - Melatonin 15 qhs, lamictal 25, gabapentin 300 BID per psych recs     Dispo: Pt is stable, doing well and meeting all postpartum milestones. Possible discharge to home today pending attending approval and pending  [facility] for housing.

## 2025-03-27 RX ORDER — GABAPENTIN 400 MG/1
300 CAPSULE ORAL
Refills: 0 | Status: DISCONTINUED | OUTPATIENT
Start: 2025-03-27 | End: 2025-03-28

## 2025-03-27 RX ORDER — MELATONIN 5 MG
10 TABLET ORAL AT BEDTIME
Refills: 0 | Status: DISCONTINUED | OUTPATIENT
Start: 2025-03-27 | End: 2025-03-28

## 2025-03-27 RX ADMIN — Medication 10 MILLIGRAM(S): at 22:23

## 2025-03-27 RX ADMIN — Medication 600 MILLIGRAM(S): at 05:02

## 2025-03-27 RX ADMIN — LAMOTRIGINE 25 MILLIGRAM(S): 150 TABLET ORAL at 12:12

## 2025-03-27 RX ADMIN — GABAPENTIN 300 MILLIGRAM(S): 400 CAPSULE ORAL at 18:35

## 2025-03-27 RX ADMIN — MIRTAZAPINE 15 MILLIGRAM(S): 30 TABLET, FILM COATED ORAL at 22:23

## 2025-03-27 RX ADMIN — Medication 600 MILLIGRAM(S): at 12:11

## 2025-03-27 RX ADMIN — Medication 975 MILLIGRAM(S): at 22:23

## 2025-03-27 RX ADMIN — Medication 1 TABLET(S): at 12:12

## 2025-03-27 NOTE — PROGRESS NOTE ADULT - ASSESSMENT
Assessment/Plan: 34y  now PPD#3 s/p  at 31w1d EGA due to PTL.     #Routine post partum care  - Doing well postpartum  - Hgb 11.4 > 11.2   - Pain: well controlled on PO pain meds  - GI: regular diet, normal bowel function  - : voiding, lochia decreasing  - DVT ppx: SCDs, ambulation encouraged  - Healthy baby M   - Melatonin qhs for sleep  - Melatonin 15 qhs, lamictal 25, gabapentin 300 BID per psych recs     Dispo: Pt is stable, doing well and meeting all postpartum milestones. Possible discharge to home today pending attending approval and pending  [facility] for housing.

## 2025-03-27 NOTE — PROGRESS NOTE ADULT - SUBJECTIVE AND OBJECTIVE BOX
ZACHARY BRENNER is a 34y  now PPD#3 s/p  at 31w1d EGA due to PTL.     Subjective:    No acute events overnight.  Reports improved sleep overnight with melatonin   Pain is well controlled.  +flatus, + voiding.  Ambulating and tolerating PO.  Appropriate lochia.  Denies fever, chills, nausea, and vomiting.  She denies lightheadedness, dizziness, HA, blurry vision, palpitations, chest pain and SOB.     Objective:    Vital Signs Last 24 Hrs  T(C): 36.8 (27 Mar 2025 03:55), Max: 37.1 (26 Mar 2025 15:57)  T(F): 98.3 (27 Mar 2025 03:55), Max: 98.8 (26 Mar 2025 19:42)  HR: 72 (27 Mar 2025 03:55) (67 - 81)  BP: 118/79 (27 Mar 2025 03:55) (115/80 - 138/85)  RR: 18 (27 Mar 2025 03:55) (18 - 18)  SpO2: 99% (27 Mar 2025 03:55) (98% - 99%)    Physical exam:  General: AOx3, NAD.  Abdomen: Soft, nontender, fundus firm.  Vaginal: expectant lochia  Ext: No DVT signs, warm extremities.                          11.2   x     )-----------( x        ( 24 Mar 2025 11:45 )             31.9

## 2025-03-28 VITALS
SYSTOLIC BLOOD PRESSURE: 117 MMHG | TEMPERATURE: 99 F | DIASTOLIC BLOOD PRESSURE: 82 MMHG | HEART RATE: 63 BPM | RESPIRATION RATE: 18 BRPM | OXYGEN SATURATION: 94 %

## 2025-03-28 PROCEDURE — 86762 RUBELLA ANTIBODY: CPT

## 2025-03-28 PROCEDURE — 86803 HEPATITIS C AB TEST: CPT

## 2025-03-28 PROCEDURE — 88307 TISSUE EXAM BY PATHOLOGIST: CPT

## 2025-03-28 PROCEDURE — 85014 HEMATOCRIT: CPT

## 2025-03-28 PROCEDURE — 36415 COLL VENOUS BLD VENIPUNCTURE: CPT

## 2025-03-28 PROCEDURE — 86901 BLOOD TYPING SEROLOGIC RH(D): CPT

## 2025-03-28 PROCEDURE — 87389 HIV-1 AG W/HIV-1&-2 AB AG IA: CPT

## 2025-03-28 PROCEDURE — 85025 COMPLETE CBC W/AUTO DIFF WBC: CPT

## 2025-03-28 PROCEDURE — 87340 HEPATITIS B SURFACE AG IA: CPT

## 2025-03-28 PROCEDURE — 86780 TREPONEMA PALLIDUM: CPT

## 2025-03-28 PROCEDURE — 59050 FETAL MONITOR W/REPORT: CPT

## 2025-03-28 PROCEDURE — 85018 HEMOGLOBIN: CPT

## 2025-03-28 PROCEDURE — 86703 HIV-1/HIV-2 1 RESULT ANTBDY: CPT

## 2025-03-28 PROCEDURE — 86850 RBC ANTIBODY SCREEN: CPT

## 2025-03-28 PROCEDURE — 86900 BLOOD TYPING SEROLOGIC ABO: CPT

## 2025-03-28 PROCEDURE — 86765 RUBEOLA ANTIBODY: CPT

## 2025-03-28 RX ORDER — LAMOTRIGINE 150 MG/1
1 TABLET ORAL
Qty: 30 | Refills: 0
Start: 2025-03-28 | End: 2025-04-26

## 2025-03-28 RX ORDER — GABAPENTIN 400 MG/1
1 CAPSULE ORAL
Qty: 60 | Refills: 0
Start: 2025-03-28 | End: 2025-04-26

## 2025-03-28 RX ORDER — MIRTAZAPINE 30 MG/1
1 TABLET, FILM COATED ORAL
Qty: 30 | Refills: 0
Start: 2025-03-28 | End: 2025-04-26

## 2025-03-28 RX ADMIN — Medication 600 MILLIGRAM(S): at 05:55

## 2025-03-28 RX ADMIN — LAMOTRIGINE 25 MILLIGRAM(S): 150 TABLET ORAL at 13:48

## 2025-03-28 RX ADMIN — GABAPENTIN 300 MILLIGRAM(S): 400 CAPSULE ORAL at 05:55

## 2025-03-28 RX ADMIN — Medication 1 TABLET(S): at 12:55

## 2025-03-28 NOTE — PROGRESS NOTE ADULT - PROBLEM SELECTOR PROBLEM 3
temperature for up to 10 days  What happens if I miss a dose? Use the medicine as soon as you can, but skip the missed dose if your next dose is due in less than 2 hours. Do not use two doses at one time. What happens if I overdose? Seek emergency medical attention or call the Poison Help line at 1-580.517.2186. What should I avoid while taking oseltamivir? Do not use a nasal flu vaccine (FluMist) within 48 hours after taking oseltamivir. Oseltamivir may interfere with the drug action of FluMist, making the vaccine less effective. Follow your doctor's instructions. What are the possible side effects of oseltamivir? Get emergency medical help if you have signs of an allergic reaction (hives, difficult breathing, swelling in your face or throat) or a severe skin reaction (fever, sore throat, burning eyes, skin pain, red or purple skin rash with blistering and peeling). Some people using oseltamivir (especially children) have had sudden unusual changes in mood or behavior. It is not certain that oseltamivir is the exact cause of these symptoms. Even without using oseltamivir, anyone with influenza can have neurologic or behavioral symptoms. Call your doctor right away if the person using this medicine has:  · sudden confusion;  · tremors or shaking;  · unusual behavior; or  · hallucinations (hearing or seeing things that are not there). Common side effects may include:  · nausea, vomiting;  · headache; or  · pain. This is not a complete list of side effects and others may occur. Call your doctor for medical advice about side effects. You may report side effects to FDA at 0-870-CGN-0599. What other drugs will affect oseltamivir? Other drugs may affect oseltamivir, including prescription and over-the-counter medicines, vitamins, and herbal products. Tell your doctor about all your current medicines and any medicine you start or stop using. Where can I get more information?   Your pharmacist can provide more
 delivery
[employer] disclaims any warranty or liability for your use of this information.
 delivery

## 2025-03-28 NOTE — PROGRESS NOTE ADULT - ASSESSMENT
Assessment/Plan: 34y  now PPD#4 s/p  at 31w1d EGA due to PTL.     #Routine post partum care  - Doing well postpartum  - Hgb 11.4 > 11.2   - Pain: well controlled on PO pain meds  - GI: regular diet, normal bowel function  - : voiding, lochia decreasing  - DVT ppx: SCDs, ambulation encouraged   - Melatonin qhs for sleep  - Melatonin 15 qhs, lamictal 25, gabapentin 300 BID per psych recs     Dispo: Pt is stable, doing well and meeting all postpartum milestones. Possible discharge to home today pending attending approval and pending  [facility] for housing.

## 2025-03-28 NOTE — PROGRESS NOTE ADULT - SUBJECTIVE AND OBJECTIVE BOX
ZACHARY BRENNER is a 34y  now PPD#4 s/p  at 31w1d EGA due to PTL.     Subjective:    No acute events overnight. No complaints overnight  Pain is well controlled.  +flatus/-BM. + voiding.  Ambulating and tolerating PO.  Appropriate lochia.  Denies fever, chills, nausea, and vomiting.  She denies lightheadedness, dizziness, HA, blurry vision, palpitations, chest pain and SOB.     Objective:    Vital Signs Last 24 Hrs  T(C): 37.1 (28 Mar 2025 04:00), Max: 37.1 (28 Mar 2025 04:00)  T(F): 98.7 (28 Mar 2025 04:00), Max: 98.7 (28 Mar 2025 04:00)  HR: 63 (28 Mar 2025 04:00) (63 - 76)  BP: 117/82 (28 Mar 2025 04:00) (117/82 - 128/76)  BP(mean): --  RR: 18 (28 Mar 2025 04:00) (18 - 18)  SpO2: 94% (28 Mar 2025 04:00) (94% - 99%)    Parameters below as of 28 Mar 2025 04:00  Patient On (Oxygen Delivery Method): room air    Physical exam:  General: AOx3, NAD.  Abdomen: Soft, nontender, fundus firm.  Vaginal: expectant lochia  Ext: No DVT signs, warm extremities.                          11.2   x     )-----------( x        ( 24 Mar 2025 11:45 )             31.9

## 2025-03-28 NOTE — PROGRESS NOTE ADULT - ATTENDING COMMENTS
Pt feels well on PPD 1 after  at 31+wks for PTL  Her pain is well controlled.  Only complain is mild cramping.  Bleeding is light.  Continue routine PP care  Will await SW to confirm placement for patient prior to discharge.  Pt has been seeing Dr Neves for her prenatal care and plans to see her for PP f/u  Discharge planning pending confirmation of placement.    ILDEFONSO Martínez (OB hospitalist)
34y  now stable PPD#3 s/p  at 31w1d EGA due to PTL, stable della restart gabapentin, awaiting placement.
Pt is doing well on PPD 4  She has no complaints today  Awaiting placement will f/u with SW today  Continue routine PP care    ILDEFONSO Martínez

## 2025-04-04 LAB — SURGICAL PATHOLOGY STUDY: SIGNIFICANT CHANGE UP
